# Patient Record
Sex: MALE | Race: WHITE | Employment: FULL TIME | URBAN - METROPOLITAN AREA
[De-identification: names, ages, dates, MRNs, and addresses within clinical notes are randomized per-mention and may not be internally consistent; named-entity substitution may affect disease eponyms.]

---

## 2018-06-12 ENCOUNTER — HOSPITAL ENCOUNTER (EMERGENCY)
Age: 33
Discharge: HOME | End: 2018-06-12
Attending: EMERGENCY MEDICINE
Payer: COMMERCIAL

## 2018-06-12 VITALS
BODY MASS INDEX: 33.33 KG/M2 | TEMPERATURE: 98 F | RESPIRATION RATE: 16 BRPM | SYSTOLIC BLOOD PRESSURE: 121 MMHG | DIASTOLIC BLOOD PRESSURE: 74 MMHG | HEIGHT: 69 IN | OXYGEN SATURATION: 96 % | WEIGHT: 225 LBS | HEART RATE: 81 BPM

## 2018-06-12 DIAGNOSIS — R19.7 DIARRHEA, UNSPECIFIED TYPE: ICD-10-CM

## 2018-06-12 DIAGNOSIS — R10.84 ABDOMINAL PAIN, GENERALIZED: ICD-10-CM

## 2018-06-12 DIAGNOSIS — K62.5 RECTAL BLEEDING: Primary | ICD-10-CM

## 2018-06-12 LAB
ALBUMIN SERPL-MCNC: 4.2 G/DL (ref 3.5–5)
ALBUMIN/GLOB SERPL: 1.2 {RATIO} (ref 1.2–3.5)
ALP SERPL-CCNC: 92 U/L (ref 50–136)
ALT SERPL-CCNC: 146 U/L (ref 12–65)
ANION GAP SERPL CALC-SCNC: 14 MMOL/L (ref 7–16)
AST SERPL-CCNC: 49 U/L (ref 15–37)
BASOPHILS # BLD: 0 K/UL (ref 0–0.2)
BASOPHILS NFR BLD: 0 % (ref 0–2)
BILIRUB SERPL-MCNC: 0.6 MG/DL (ref 0.2–1.1)
BUN SERPL-MCNC: 15 MG/DL (ref 6–23)
CALCIUM SERPL-MCNC: 8.8 MG/DL (ref 8.3–10.4)
CHLORIDE SERPL-SCNC: 103 MMOL/L (ref 98–107)
CO2 SERPL-SCNC: 21 MMOL/L (ref 21–32)
CREAT SERPL-MCNC: 1.04 MG/DL (ref 0.8–1.5)
CRP SERPL-MCNC: 1.2 MG/DL (ref 0–0.9)
DIFFERENTIAL METHOD BLD: NORMAL
EOSINOPHIL # BLD: 0.2 K/UL (ref 0–0.8)
EOSINOPHIL NFR BLD: 3 % (ref 0.5–7.8)
ERYTHROCYTE [DISTWIDTH] IN BLOOD BY AUTOMATED COUNT: 12.8 % (ref 11.9–14.6)
ERYTHROCYTE [SEDIMENTATION RATE] IN BLOOD: 5 MM/HR (ref 0–15)
GLOBULIN SER CALC-MCNC: 3.5 G/DL (ref 2.3–3.5)
GLUCOSE SERPL-MCNC: 104 MG/DL (ref 65–100)
HCT VFR BLD AUTO: 48.7 % (ref 41.1–50.3)
HGB BLD-MCNC: 16.4 G/DL (ref 13.6–17.2)
IMM GRANULOCYTES # BLD: 0 K/UL (ref 0–0.5)
IMM GRANULOCYTES NFR BLD AUTO: 0 % (ref 0–5)
LIPASE SERPL-CCNC: 100 U/L (ref 73–393)
LYMPHOCYTES # BLD: 2.7 K/UL (ref 0.5–4.6)
LYMPHOCYTES NFR BLD: 34 % (ref 13–44)
MCH RBC QN AUTO: 30.1 PG (ref 26.1–32.9)
MCHC RBC AUTO-ENTMCNC: 33.7 G/DL (ref 31.4–35)
MCV RBC AUTO: 89.4 FL (ref 79.6–97.8)
MONOCYTES # BLD: 0.7 K/UL (ref 0.1–1.3)
MONOCYTES NFR BLD: 8 % (ref 4–12)
NEUTS SEG # BLD: 4.5 K/UL (ref 1.7–8.2)
NEUTS SEG NFR BLD: 55 % (ref 43–78)
PLATELET # BLD AUTO: 178 K/UL (ref 150–450)
PMV BLD AUTO: 11.7 FL (ref 10.8–14.1)
POTASSIUM SERPL-SCNC: 4 MMOL/L (ref 3.5–5.1)
PROT SERPL-MCNC: 7.7 G/DL (ref 6.3–8.2)
RBC # BLD AUTO: 5.45 M/UL (ref 4.23–5.67)
SODIUM SERPL-SCNC: 138 MMOL/L (ref 136–145)
WBC # BLD AUTO: 8.2 K/UL (ref 4.3–11.1)

## 2018-06-12 PROCEDURE — 83690 ASSAY OF LIPASE: CPT | Performed by: EMERGENCY MEDICINE

## 2018-06-12 PROCEDURE — 86140 C-REACTIVE PROTEIN: CPT | Performed by: EMERGENCY MEDICINE

## 2018-06-12 PROCEDURE — 80053 COMPREHEN METABOLIC PANEL: CPT | Performed by: EMERGENCY MEDICINE

## 2018-06-12 PROCEDURE — 85652 RBC SED RATE AUTOMATED: CPT | Performed by: EMERGENCY MEDICINE

## 2018-06-12 PROCEDURE — 74011250637 HC RX REV CODE- 250/637: Performed by: EMERGENCY MEDICINE

## 2018-06-12 PROCEDURE — 85025 COMPLETE CBC W/AUTO DIFF WBC: CPT | Performed by: EMERGENCY MEDICINE

## 2018-06-12 PROCEDURE — 99284 EMERGENCY DEPT VISIT MOD MDM: CPT | Performed by: EMERGENCY MEDICINE

## 2018-06-12 RX ORDER — ONDANSETRON 4 MG/1
4 TABLET, ORALLY DISINTEGRATING ORAL ONCE
Status: COMPLETED | OUTPATIENT
Start: 2018-06-12 | End: 2018-06-12

## 2018-06-12 RX ORDER — ONDANSETRON 4 MG/1
4 TABLET, ORALLY DISINTEGRATING ORAL
Qty: 12 TAB | Refills: 0 | Status: SHIPPED | OUTPATIENT
Start: 2018-06-12 | End: 2018-08-31

## 2018-06-12 RX ORDER — ONDANSETRON 4 MG/1
TABLET, ORALLY DISINTEGRATING ORAL
Status: ACTIVE
Start: 2018-06-12 | End: 2018-06-13

## 2018-06-12 RX ORDER — FAMOTIDINE 20 MG/1
TABLET, FILM COATED ORAL
Status: ACTIVE
Start: 2018-06-12 | End: 2018-06-13

## 2018-06-12 RX ORDER — FAMOTIDINE 20 MG/1
20 TABLET, FILM COATED ORAL
Status: COMPLETED | OUTPATIENT
Start: 2018-06-12 | End: 2018-06-12

## 2018-06-12 RX ADMIN — FAMOTIDINE 20 MG: 20 TABLET ORAL at 16:21

## 2018-06-12 RX ADMIN — ONDANSETRON 4 MG: 4 TABLET, ORALLY DISINTEGRATING ORAL at 16:21

## 2018-06-12 NOTE — LETTER
[address] [facility] EMERGENCY DEPT 
[address] [place] [id number] 
[phone] Work/School Note Date: 6/12/2018 To Whom It May concern: 
 
Dileep Guerrero was seen and treated today in the emergency room by the following provider(s): 
No providers found. Dileep Guerrero may return to work on 6/13/18. Sincerely, Alexandra Jensen RN

## 2018-06-12 NOTE — DISCHARGE INSTRUCTIONS
Avoid tobacco, alcohol, caffeine, aspirin, ibuprofen, aleve  Take Omeprazole daily  Take Zofran as needed for nausea

## 2018-06-12 NOTE — ED NOTES
I have reviewed discharge instructions with the patient. The patient verbalized understanding. Patient left ED via Discharge Method: ambulatory to Home with self). Opportunity for questions and clarification provided. Patient given 1 scripts. To continue your aftercare when you leave the hospital, you may receive an automated call from our care team to check in on how you are doing. This is a free service and part of our promise to provide the best care and service to meet your aftercare needs.  If you have questions, or wish to unsubscribe from this service please call [phone]. Thank you for Choosing our [facility] Emergency Department.

## 2018-06-12 NOTE — ED TRIAGE NOTES
Reports bright red rectal bleeding for over a month, abd \"bloating\" and back pain radiating into his shoulders.

## 2018-06-13 NOTE — ED PROVIDER NOTES
HPI Comments: Patient states onset of diarrhea and vomiting yesterday. Watery stools. Has had some bright red blood on tissue and in toilet. No melena. Has had nausea with vomiting three times yesterday. Bloated feeling in abdomen. Goes around to his back. No fever. Took imodium yesterday. Takes Ibuprofen about 4 times a week. Drinks alcohol 3 times a week. Does not smoke. Reports that he has had bright red blood per rectum intermittently for several months. He has not had EGD or colonoscopy in the past. No family history of colon cancer. Has had family history of pancreatic cancer. Patient is a 26 y.o. male presenting with diarrhea. The history is provided by the patient and medical records. Diarrhea    This is a new problem. The current episode started yesterday. The problem has not changed since onset. The pain is associated with vomiting. The pain is located in the generalized abdominal region. The quality of the pain is aching. The pain is mild. Associated symptoms include diarrhea, hematochezia, nausea, vomiting and back pain. Pertinent negatives include no fever, no melena, no constipation, no dysuria, no frequency, no myalgias and no testicular pain. Nothing worsens the pain. The pain is relieved by nothing. Past Medical History:   Diagnosis Date    ADD (attention deficit disorder)     Psychiatric disorder        Past Surgical History:   Procedure Laterality Date    HX ORTHOPAEDIC      right elbow         History reviewed. No pertinent family history. Social History     Social History    Marital status: SINGLE     Spouse name: N/A    Number of children: N/A    Years of education: N/A     Occupational History    Not on file.      Social History Main Topics    Smoking status: Current Every Day Smoker    Smokeless tobacco: Never Used    Alcohol use Yes      Comment: occ    Drug use: No    Sexual activity: Not on file     Other Topics Concern    Not on file     Social History Narrative ALLERGIES: Review of patient's allergies indicates no known allergies. Review of Systems   Constitutional: Negative for appetite change, chills, fever and unexpected weight change. HENT: Negative. Eyes: Negative. Respiratory: Negative. Cardiovascular: Negative. Gastrointestinal: Positive for abdominal pain, anal bleeding, diarrhea, hematochezia, nausea and vomiting. Negative for constipation, melena and rectal pain. Genitourinary: Negative for dysuria, frequency, scrotal swelling and testicular pain. Musculoskeletal: Positive for back pain. Negative for myalgias. Skin: Negative. Neurological: Negative. Hematological: Negative. Psychiatric/Behavioral:        ADHD on medications       Vitals:    06/12/18 1507 06/12/18 1605 06/12/18 1717   BP: 134/86 128/81 121/74   Pulse: (!) 108 88 81   Resp: 18 18 16   Temp: 98 °F (36.7 °C) 98 °F (36.7 °C) 98 °F (36.7 °C)   SpO2: 95% 96% 96%   Weight: 102.1 kg (225 lb)     Height: 5' 9\" (1.753 m)              Physical Exam   Constitutional: He is oriented to person, place, and time. He appears well-developed and well-nourished. HENT:   Head: Normocephalic and atraumatic. Right Ear: External ear normal.   Left Ear: External ear normal.   Mouth/Throat: Oropharynx is clear and moist.   Eyes: Conjunctivae and EOM are normal. Pupils are equal, round, and reactive to light. No scleral icterus. Neck: Normal range of motion. Neck supple. No JVD present. Cardiovascular: Normal rate, regular rhythm, normal heart sounds and intact distal pulses. Pulmonary/Chest: Effort normal and breath sounds normal.   Abdominal: Soft. Bowel sounds are normal. He exhibits no distension and no mass. There is no tenderness. Genitourinary: Penis normal.   Genitourinary Comments: Testicles normal. Rectal - no mass, stool heme negative, prostate small and not tender. No external hemorrhoids or fissures seen. Musculoskeletal: Normal range of motion.  He exhibits no edema or tenderness. Neurological: He is alert and oriented to person, place, and time. Skin: Skin is warm and dry. Psychiatric: He has a normal mood and affect. His behavior is normal.   Nursing note and vitals reviewed. MDM      ED Course       Procedures    Vomiting and diarrhea - acute  Labs reviewed  Pepcid 20 mg IV, Zofran 4 mg IV  Results and instructions discussed with patient  Avoid alcohol, tobacco, caffeine, NSAID's  Start Omeprazole   Rx Zofran 4 mg # 12  Clear liquids today  Follow up with PCP and GI for further evaluation of reported bleeding  Return with new or worse symptoms.

## 2019-02-15 PROBLEM — Z79.899 CONTROLLED SUBSTANCE AGREEMENT SIGNED: Status: ACTIVE | Noted: 2019-02-15

## 2020-09-18 ENCOUNTER — APPOINTMENT (OUTPATIENT)
Dept: GENERAL RADIOLOGY | Age: 35
End: 2020-09-18
Attending: EMERGENCY MEDICINE

## 2020-09-18 ENCOUNTER — HOSPITAL ENCOUNTER (EMERGENCY)
Age: 35
Discharge: HOME | End: 2020-09-18
Attending: EMERGENCY MEDICINE

## 2020-09-18 VITALS
HEART RATE: 100 BPM | DIASTOLIC BLOOD PRESSURE: 86 MMHG | SYSTOLIC BLOOD PRESSURE: 126 MMHG | HEIGHT: 70 IN | WEIGHT: 220 LBS | RESPIRATION RATE: 16 BRPM | TEMPERATURE: 97.5 F | BODY MASS INDEX: 31.5 KG/M2 | OXYGEN SATURATION: 98 %

## 2020-09-18 DIAGNOSIS — S93.601A SPRAIN OF RIGHT FOOT, INITIAL ENCOUNTER: ICD-10-CM

## 2020-09-18 DIAGNOSIS — S93.401A SPRAIN OF RIGHT ANKLE, UNSPECIFIED LIGAMENT, INITIAL ENCOUNTER: Primary | ICD-10-CM

## 2020-09-18 PROCEDURE — 73610 X-RAY EXAM OF ANKLE: CPT

## 2020-09-18 PROCEDURE — 73630 X-RAY EXAM OF FOOT: CPT

## 2020-09-18 PROCEDURE — 99283 EMERGENCY DEPT VISIT LOW MDM: CPT

## 2020-09-18 RX ORDER — IBUPROFEN 800 MG/1
800 TABLET ORAL
Qty: 21 TAB | Refills: 0 | Status: SHIPPED | OUTPATIENT
Start: 2020-09-18 | End: 2021-01-07

## 2020-09-18 RX ORDER — DEXTROAMPHETAMINE SACCHARATE, AMPHETAMINE ASPARTATE, DEXTROAMPHETAMINE SULFATE AND AMPHETAMINE SULFATE 2.5; 2.5; 2.5; 2.5 MG/1; MG/1; MG/1; MG/1
10 TABLET ORAL 2 TIMES DAILY
COMMUNITY
End: 2021-01-07

## 2020-09-18 RX ORDER — PROPRANOLOL HYDROCHLORIDE 60 MG/1
10 CAPSULE, EXTENDED RELEASE ORAL DAILY
COMMUNITY
End: 2021-01-07

## 2020-09-18 RX ORDER — PROPRANOLOL HYDROCHLORIDE 10 MG/1
10 TABLET ORAL DAILY
COMMUNITY
End: 2021-01-07

## 2020-09-18 NOTE — DISCHARGE INSTRUCTIONS
Learning About RICE (Rest, Ice, Compression, and Elevation)  What is RICE? RICE is a way to care for an injury. RICE helps relieve pain and swelling. It may also help with healing and flexibility. RICE stands for:  · R est and protect the injured or sore area. · I ce or a cold pack used as soon as possible. · C ompression, or wrapping the injured or sore area with an elastic bandage. · E levation (propping up) the injured or sore area. How do you do RICE? You can use RICE for home treatment when you have general aches and pains or after an injury or surgery. Rest  · Do not put weight on the injury for at least 24 to 48 hours. · Use crutches for a badly sprained knee or ankle. · Support a sprained wrist, elbow, or shoulder with a sling. Ice  · Put ice or a cold pack on the injury right away to reduce pain and swelling. Frozen vegetables will also work as an ice pack. Put a thin cloth between the ice or cold pack and your skin. The cloth protects the injured area from getting too cold. · Use ice for 10 to 15 minutes at a time for the first 48 to 72 hours. Compression  · Use compression for sprains, strains, and surgeries of the arms and legs. · Wrap the injured area with an elastic bandage or compression sleeve to reduce swelling. · Don't wrap it too tightly. If the area below it feels numb, tingles, or feels cool, loosen the wrap. Elevation  · Use elevation for areas of the body that can be propped up, such as arms and legs. · Prop up the injured area on pillows whenever you use ice. Keep it propped up anytime you sit or lie down. · Try to keep the injured area at or above the level of your heart. This will help reduce swelling and bruising. Where can you learn more? Go to http://jorge-susan.info/  Enter I463 in the search box to learn more about \"Learning About RICE (Rest, Ice, Compression, and Elevation). \"  Current as of: March 2, 2020               Content Version: 12.6  © 2006-2020 [employer]. Care instructions adapted under license by [employer] (which disclaims liability or warranty for this information). If you have questions about a medical condition or this instruction, always ask your healthcare professional. [address] any warranty or liability for your use of this information. Patient Education        Ankle Sprain: Care Instructions  Your Care Instructions     An ankle sprain can happen when you twist your ankle. The ligaments that support the ankle can get stretched and torn. Often the ankle is swollen and painful. Ankle sprains may take from several weeks to several months to heal. Usually, the more pain and swelling you have, the more severe your ankle sprain is and the longer it will take to heal. You can heal faster and regain strength in your ankle with good home treatment. It is very important to give your ankle time to heal completely, so that you do not easily hurt your ankle again. Follow-up care is a key part of your treatment and safety. Be sure to make and go to all appointments, and call your doctor if you are having problems. It's also a good idea to know your test results and keep a list of the medicines you take. How can you care for yourself at home? · Prop up your foot on pillows as much as possible for the next 3 days. Try to keep your ankle above the level of your heart. This will help reduce the swelling. · Follow your doctor's directions for wearing a splint or elastic bandage. Wrapping the ankle may help reduce or prevent swelling. · Your doctor may give you a splint, a brace, an air stirrup, or another form of ankle support to protect your ankle until it is healed. Wear it as directed while your ankle is healing. Do not remove it unless your doctor tells you to. After your ankle has healed, ask your doctor whether you should wear the brace when you exercise.   · Put ice or cold packs on your injured ankle for 10 to 20 minutes at a time. Try to do this every 1 to 2 hours for the next 3 days (when you are awake) or until the swelling goes down. Put a thin cloth between the ice and your skin. · You may need to use crutches until you can walk without pain. If you do use crutches, try to bear some weight on your injured ankle if you can do so without pain. This helps the ankle heal.  · Take pain medicines exactly as directed. ? If the doctor gave you a prescription medicine for pain, take it as prescribed. ? If you are not taking a prescription pain medicine, ask your doctor if you can take an over-the-counter medicine. · If you have been given ankle exercises to do at home, do them exactly as instructed. These can promote healing and help prevent lasting weakness. When should you call for help? Call your doctor now or seek immediate medical care if:    · Your pain is getting worse.     · Your swelling is getting worse.     · Your splint feels too tight or you are unable to loosen it. Watch closely for changes in your health, and be sure to contact your doctor if:    · You are not getting better after 1 week. Where can you learn more? Go to http://jorge-susan.info/  Enter Z705 in the search box to learn more about \"Ankle Sprain: Care Instructions. \"  Current as of: March 2, 2020               Content Version: 12.6  © 2633-0440 [employer]. Care instructions adapted under license by [employer] (which disclaims liability or warranty for this information). If you have questions about a medical condition or this instruction, always ask your healthcare professional. [place] [address] any warranty or liability for your use of this information. Patient Education        Ankle Sprain: Rehab Exercises  Introduction  Here are some examples of exercises for you to try.  The exercises may be suggested for a condition or for rehabilitation. Start each exercise slowly. Ease off the exercises if you start to have pain. You will be told when to start these exercises and which ones will work best for you. How to do the exercises  \"Alphabet\" exercise   1. Trace the alphabet with your toe. This helps your ankle move in all directions. Side-to-side knee swing exercise   1. Sit in a chair with your foot flat on the floor. 2. Slowly move your knee from side to side. Keep your foot pressed flat. 3. Continue this exercise for 2 to 3 minutes. Towel curl   1. While sitting, place your foot on a towel on the floor. Scrunch the towel toward you with your toes. 2. Then use your toes to push the towel away from you. 3. To make this exercise more challenging you can put something on the other end of the towel. A can of soup is about the right weight for this. Towel stretch   1. Sit with your legs extended and knees straight. 2. Place a towel around your foot just under the toes. 3. Hold each end of the towel in each hand, with your hands above your knees. 4. Pull back with the towel so that your foot stretches toward you. 5. Hold the position for at least 15 to 30 seconds. 6. Repeat 2 to 4 times a session. Do up to 5 sessions a day. Ankle eversion exercise   1. Start by sitting with your foot flat on the floor. Push your foot outward against a wall or a piece of furniture that doesn't move. Hold for about 6 seconds, and relax. Repeat 8 to 12 times. 2. After you feel comfortable with this, try using rubber tubing looped around the outside of your feet for resistance. Push your foot out to the side against the tubing, and then count to 10 as you slowly bring your foot back to the middle. Repeat 8 to 12 times. Isometric opposition exercises   1. While sitting, put your feet together flat on the floor. 2. Press your injured foot inward against your other foot. Hold for about 6 seconds, and relax. Repeat 8 to 12 times.   3. Then place the heel of your other foot on top of the injured one. Push down with the top heel while trying to push up with your injured foot. Hold for about 6 seconds, and relax. Repeat 8 to 12 times. Resisted ankle inversion   1. Sit on the floor with your good leg crossed over your other leg. 2. Hold both ends of an exercise band and loop the band around the inside of your affected foot. Then press your other foot against the band. 3. Keeping your legs crossed, slowly push your affected foot against the band so that foot moves away from your other foot. Then slowly relax. 4. Repeat 8 to 12 times. Resisted ankle eversion   1. Sit on the floor with your legs straight. 2. Hold both ends of an exercise band and loop the band around the outside of your affected foot. Then press your other foot against the band. 3. Keeping your leg straight, slowly push your affected foot outward against the band and away from your other foot without letting your leg rotate. Then slowly relax. 4. Repeat 8 to 12 times. Resisted ankle dorsiflexion   1. Tie the ends of an exercise band together to form a loop. Attach one end of the loop to a secure object or shut a door on it to hold it in place. (Or you can have someone hold one end of the loop to provide resistance.)  2. While sitting on the floor or in a chair, loop the other end of the band over the top of your affected foot. 3. Keeping your knee and leg straight, slowly flex your foot to pull back on the exercise band, and then slowly relax. 4. Repeat 8 to 12 times. Single-leg balance   1. Stand on a flat surface with your arms stretched out to your sides like you are making the letter \"T. \" Then lift your good leg off the floor, bending it at the knee. If you are not steady on your feet, use one hand to hold on to a chair, counter, or wall. 2. Standing on the leg with your affected ankle, keep that knee straight. Try to balance on that leg for up to 30 seconds.  Then rest for up to 10 seconds. 3. Repeat 6 to 8 times. 4. When you can balance on your affected leg for 30 seconds with your eyes open, try to balance on it with your eyes closed. 5. When you can do this exercise with your eyes closed for 30 seconds and with ease and no pain, try standing on a pillow or piece of foam, and repeat steps 1 through 4. Follow-up care is a key part of your treatment and safety. Be sure to make and go to all appointments, and call your doctor if you are having problems. It's also a good idea to know your test results and keep a list of the medicines you take. Where can you learn more? Go to http://www.gray.com/  Enter David Reese in the search box to learn more about \"Ankle Sprain: Rehab Exercises. \"  Current as of: March 2, 2020               Content Version: 12.6  © 2006-2020 [employer]. Care instructions adapted under license by [employer] (which disclaims liability or warranty for this information). If you have questions about a medical condition or this instruction, always ask your healthcare professional. [place] [address] any warranty or liability for your use of this information. Patient Education        Foot Sprain: Care Instructions  Your Care Instructions     A foot sprain occurs when you stretch or tear the ligaments around your foot. Ligaments are the tough tissues that connect one bone to another. A sprain can happen when you run, fall, or hit your toe against something. Sprains often happen when you jump or change direction quickly. This may occur when you play basketball, soccer, or other sports. Most foot sprains will get better with treatment at home. Follow-up care is a key part of your treatment and safety. Be sure to make and go to all appointments, and call your doctor if you are having problems. It's also a good idea to know your test results and keep a list of the medicines you take.   How can you care for yourself at home? · Walk or put weight on your sprained foot as long as it does not hurt. · If your doctor gave you a splint or immobilizer, wear it as directed. If you were given crutches, use them as directed. · For the first 2 days after your injury, avoid hot showers, hot tubs, or hot packs. They may increase swelling. · Put ice or a cold pack on your foot for 10 to 20 minutes at a time to stop swelling. Try this every 1 to 2 hours for 3 days (when you are awake) or until the swelling goes down. Put a thin cloth between the ice pack and your skin. Keep your splint dry. · After 2 or 3 days, if your swelling is gone, put a heating pad (set on low) or a warm cloth on your foot. Some doctors suggest that you go back and forth between hot and cold treatments. · Prop up your foot on a pillow when you ice it or anytime you sit or lie down. Try to keep it above the level of your heart. This will help reduce swelling. · Take pain medicines exactly as directed. ? If the doctor gave you a prescription medicine for pain, take it as prescribed. ? If you are not taking a prescription pain medicine, ask your doctor if you can take an over-the-counter medicine. · Do any exercises that your doctor or physical therapist suggests. · Return to your usual exercise gradually as you feel better. When should you call for help? Call your doctor now or seek immediate medical care if:    · You have increased or severe pain.     · Your toes are cool or pale or change color.     · Your wrap or splint feels too tight.     · You have signs of a blood clot, such as:  ? Pain in your calf, back of the knee, thigh, or groin. ? Redness and swelling in your leg or groin.     · You have tingling, weakness, or numbness in your leg or foot.    Watch closely for changes in your health, and be sure to contact your doctor if:    · You cannot put any weight on your foot.     · You get a fever.     · You do not get better as expected. Where can you learn more? Go to http://jorge-susan.info/  Enter S722 in the search box to learn more about \"Foot Sprain: Care Instructions. \"  Current as of: March 2, 2020               Content Version: 12.6  © 2972-8965 [employer]. Care instructions adapted under license by [employer] (which disclaims liability or warranty for this information). If you have questions about a medical condition or this instruction, always ask your healthcare professional. [address] any warranty or liability for your use of this information.

## 2020-09-18 NOTE — ED NOTES
I have reviewed discharge instructions with the patient. The patient verbalized understanding. Patient left ED via Discharge Method: ambulatory to Home with self. Opportunity for questions and clarification provided. Patient given 1 scripts. To continue your aftercare when you leave the hospital, you may receive an automated call from our care team to check in on how you are doing. This is a free service and part of our promise to provide the best care and service to meet your aftercare needs.  If you have questions, or wish to unsubscribe from this service please call [phone]. Thank you for Choosing our [facility] Emergency Department.

## 2020-09-18 NOTE — ED PROVIDER NOTES
Ciaran Wade is a 38 y.o. male seen on 9/18/2020 at 3:54 PM in the [address] [place] [address] in room HE/E.    CC: Ankle pain    HPI: 78-year-old  male complains of right foot and ankle pain status post rolling the ankle wall taking a walk at his apartment complex. Injury occurred 2 days ago but patient continues to complain of pain with ambulation. Wants to make sure nothing is broken. He does complain of some numbness to the bottom of his foot. The history is provided by the patient. Ankle Pain    This is a new problem. The current episode started 2 days ago. The problem occurs constantly. The problem has been gradually worsening. The pain is present in the right foot and right ankle. The quality of the pain is described as aching. The pain is moderate. Associated symptoms include numbness (Patient complains of some numbness to his toes) and stiffness. Pertinent negatives include no tingling, no itching, no back pain and no neck pain. The symptoms are aggravated by standing and movement. Treatments tried: Rest and compressive wraps. The treatment provided no relief. REVIEW OF SYSTEMS     Review of Systems   Constitutional: Negative for chills and fever. Musculoskeletal: Positive for arthralgias and stiffness. Negative for back pain and neck pain. Skin: Negative for itching. Neurological: Positive for numbness (Patient complains of some numbness to his toes). Negative for tingling. All other systems reviewed and are negative.       PAST MEDICAL HISTORY     Past Medical History:   Diagnosis Date    ADD (attention deficit disorder)     Psychiatric disorder      Past Surgical History:   Procedure Laterality Date    HX ORTHOPAEDIC      right elbow     Social History     Socioeconomic History    Marital status: SINGLE     Spouse name: Not on file    Number of children: Not on file    Years of education: Not on file    Highest education level: Not on file   Tobacco Use    Smoking status: Current Every Day Smoker    Smokeless tobacco: Never Used   Substance and Sexual Activity    Alcohol use: Yes     Comment: occ    Drug use: No     Prior to Admission Medications   Prescriptions Last Dose Informant Patient Reported? Taking?   dextroamphetamine-amphetamine (AdderalL) 10 mg tablet   Yes Yes   Sig: Take 10 mg by mouth two (2) times a day. propranoloL (INDERAL) 10 mg tablet   Yes Yes   Sig: Take 10 mg by mouth daily. propranolol LA (INDERAL LA) 60 mg SR capsule   Yes Yes   Sig: Take 10 mg by mouth daily. Facility-Administered Medications: None     No Known Allergies     PHYSICAL EXAM       Vitals:    09/18/20 1534   BP: 130/87   Pulse: (!) 102   Resp: 16   Temp: 98.5 °F (36.9 °C)   SpO2: 96%    Vital signs were reviewed. Physical Exam  Vitals signs and nursing note reviewed. Constitutional:       General: He is not in acute distress. HENT:      Head: Normocephalic and atraumatic. Musculoskeletal:      Right ankle: He exhibits decreased range of motion. He exhibits no swelling, no ecchymosis, no deformity, no laceration and normal pulse. Tenderness. Lateral malleolus, AITFL, CF ligament and posterior TFL tenderness found. No medial malleolus, no head of 5th metatarsal and no proximal fibula tenderness found. Achilles tendon normal.      Comments: In order to examine the ankle and foot, 3 separate compressive wraps had to be removed from patient's ankle and foot including 1 so tight around his midfoot that his toes were slightly swollen. Skin:     General: Skin is warm and dry. Neurological:      General: No focal deficit present. Mental Status: He is alert and oriented to person, place, and time.    Psychiatric:         Mood and Affect: Mood normal.         Behavior: Behavior normal.          MEDICAL DECISION MAKING     MDM  Number of Diagnoses or Management Options  Sprain of right ankle, unspecified ligament, initial encounter: new, needed workup  Sprain of right foot, initial encounter: new, needed workup     Amount and/or Complexity of Data Reviewed  Tests in the radiology section of CPT®: ordered and reviewed  Review and summarize past medical records: yes  Independent visualization of images, tracings, or specimens: yes    Risk of Complications, Morbidity, and/or Mortality  Presenting problems: low  Diagnostic procedures: low  Management options: low    Patient Progress  Patient progress: stable    Procedures    ED Course: The patient was observed in the ED. Results Reviewed:    XR ANKLE RT MIN 3 V   Final Result   IMPRESSION:   No acute radiographic abnormality. XR FOOT RT MIN 3 V   Final Result   IMPRESSION:   No acute radiographic abnormality of the right foot. Disposition: Discharge  Diagnosis: Sprain of right ankle and foot ____________________________________________________________________  I discussed the results of all labs, procedures, radiographs, and treatments with the patient and available family. Treatment plan is agreed upon and the patient is ready for discharge. All voiced understanding of the discharge plan and medication instructions or changes as appropriate. Questions about treatment in the ED were answered. All were encouraged to return should symptoms worsen or new problems develop. A portion of this note was generated using voice recognition dictation software. While the note has been reviewed for accuracy, please note certain words and phrases may not be transcribed as intended and some grammatical and/or typographical errors may be present.

## 2020-10-18 ENCOUNTER — HOSPITAL ENCOUNTER (EMERGENCY)
Age: 35
Discharge: HOME | End: 2020-10-18
Attending: EMERGENCY MEDICINE

## 2020-10-18 ENCOUNTER — APPOINTMENT (OUTPATIENT)
Dept: CT IMAGING | Age: 35
End: 2020-10-18
Attending: EMERGENCY MEDICINE

## 2020-10-18 VITALS
SYSTOLIC BLOOD PRESSURE: 121 MMHG | OXYGEN SATURATION: 99 % | RESPIRATION RATE: 16 BRPM | HEIGHT: 68 IN | WEIGHT: 225 LBS | TEMPERATURE: 98.1 F | DIASTOLIC BLOOD PRESSURE: 74 MMHG | BODY MASS INDEX: 34.1 KG/M2 | HEART RATE: 67 BPM

## 2020-10-18 DIAGNOSIS — K62.5 RECTAL BLEEDING: Primary | ICD-10-CM

## 2020-10-18 LAB
ALBUMIN SERPL-MCNC: 3.7 G/DL (ref 3.5–5)
ALBUMIN/GLOB SERPL: 1.1 {RATIO} (ref 1.2–3.5)
ALP SERPL-CCNC: 92 U/L (ref 50–136)
ALT SERPL-CCNC: 88 U/L (ref 12–65)
ANION GAP SERPL CALC-SCNC: 5 MMOL/L (ref 7–16)
AST SERPL-CCNC: 31 U/L (ref 15–37)
BASOPHILS # BLD: 0.1 K/UL (ref 0–0.2)
BASOPHILS NFR BLD: 1 % (ref 0–2)
BILIRUB SERPL-MCNC: 0.4 MG/DL (ref 0.2–1.1)
BUN SERPL-MCNC: 14 MG/DL (ref 6–23)
CALCIUM SERPL-MCNC: 8.5 MG/DL (ref 8.3–10.4)
CHLORIDE SERPL-SCNC: 110 MMOL/L (ref 98–107)
CO2 SERPL-SCNC: 27 MMOL/L (ref 21–32)
CREAT SERPL-MCNC: 0.98 MG/DL (ref 0.8–1.5)
DIFFERENTIAL METHOD BLD: NORMAL
EOSINOPHIL # BLD: 0.2 K/UL (ref 0–0.8)
EOSINOPHIL NFR BLD: 3 % (ref 0.5–7.8)
ERYTHROCYTE [DISTWIDTH] IN BLOOD BY AUTOMATED COUNT: 12.5 % (ref 11.9–14.6)
GLOBULIN SER CALC-MCNC: 3.3 G/DL (ref 2.3–3.5)
GLUCOSE SERPL-MCNC: 113 MG/DL (ref 65–100)
HCT VFR BLD AUTO: 43.2 % (ref 41.1–50.3)
HGB BLD-MCNC: 14.2 G/DL (ref 13.6–17.2)
IMM GRANULOCYTES # BLD AUTO: 0 K/UL (ref 0–0.5)
IMM GRANULOCYTES NFR BLD AUTO: 0 % (ref 0–5)
LYMPHOCYTES # BLD: 2.5 K/UL (ref 0.5–4.6)
LYMPHOCYTES NFR BLD: 33 % (ref 13–44)
MCH RBC QN AUTO: 30.5 PG (ref 26.1–32.9)
MCHC RBC AUTO-ENTMCNC: 32.9 G/DL (ref 31.4–35)
MCV RBC AUTO: 92.9 FL (ref 79.6–97.8)
MONOCYTES # BLD: 0.6 K/UL (ref 0.1–1.3)
MONOCYTES NFR BLD: 8 % (ref 4–12)
NEUTS SEG # BLD: 4.2 K/UL (ref 1.7–8.2)
NEUTS SEG NFR BLD: 55 % (ref 43–78)
NRBC # BLD: 0 K/UL (ref 0–0.2)
PLATELET # BLD AUTO: 182 K/UL (ref 150–450)
PMV BLD AUTO: 10.6 FL (ref 9.4–12.3)
POTASSIUM SERPL-SCNC: 4.1 MMOL/L (ref 3.5–5.1)
PROT SERPL-MCNC: 7 G/DL (ref 6.3–8.2)
RBC # BLD AUTO: 4.65 M/UL (ref 4.23–5.6)
SODIUM SERPL-SCNC: 142 MMOL/L (ref 136–145)
WBC # BLD AUTO: 7.6 K/UL (ref 4.3–11.1)

## 2020-10-18 PROCEDURE — 74177 CT ABD & PELVIS W/CONTRAST: CPT

## 2020-10-18 PROCEDURE — 74011000636 HC RX REV CODE- 636: Performed by: EMERGENCY MEDICINE

## 2020-10-18 PROCEDURE — 81003 URINALYSIS AUTO W/O SCOPE: CPT

## 2020-10-18 PROCEDURE — 74011000258 HC RX REV CODE- 258: Performed by: EMERGENCY MEDICINE

## 2020-10-18 PROCEDURE — 80053 COMPREHEN METABOLIC PANEL: CPT

## 2020-10-18 PROCEDURE — 99284 EMERGENCY DEPT VISIT MOD MDM: CPT

## 2020-10-18 PROCEDURE — 85025 COMPLETE CBC W/AUTO DIFF WBC: CPT

## 2020-10-18 RX ORDER — SODIUM CHLORIDE 0.9 % (FLUSH) 0.9 %
10 SYRINGE (ML) INJECTION
Status: DISCONTINUED | OUTPATIENT
Start: 2020-10-18 | End: 2020-10-18 | Stop reason: HOSPADM

## 2020-10-18 RX ORDER — SODIUM CHLORIDE 0.9 % (FLUSH) 0.9 %
10 SYRINGE (ML) INJECTION
Status: COMPLETED | OUTPATIENT
Start: 2020-10-18 | End: 2020-10-18

## 2020-10-18 RX ORDER — CLONAZEPAM 1 MG/1
TABLET ORAL
COMMUNITY
Start: 2020-08-14

## 2020-10-18 RX ADMIN — SODIUM CHLORIDE 100 ML: 900 INJECTION, SOLUTION INTRAVENOUS at 12:09

## 2020-10-18 RX ADMIN — IOPAMIDOL 100 ML: 755 INJECTION, SOLUTION INTRAVENOUS at 12:09

## 2020-10-18 RX ADMIN — Medication 10 ML: at 12:09

## 2020-10-18 NOTE — ED PROVIDER NOTES
Patient is a 13-year-old male who comes to the emergency department today complaining of rectal bleeding and some abdominal pain for the past 3 weeks. He states it first started with wiping after bowel movements has progressed to just passing red blood spontaneously. He has developed some lower abdominal crampy pain. No fever. Also states she has had some urinary frequency and urgency. The history is provided by the patient. Rectal Bleeding    This is a chronic problem. The current episode started more than 1 week ago. The stool is described as blood tinged. Associated symptoms include abdominal pain. Pertinent negatives include no dysuria, no chills, no fever, no nausea, no back pain, no vomiting and no diarrhea. He has tried nothing for the symptoms. His past medical history does not include irritable bowel syndrome, small bowel obstruction or abdominal surgery. Past Medical History:   Diagnosis Date    ADD (attention deficit disorder)     OCD (obsessive compulsive disorder)     Psychiatric disorder        Past Surgical History:   Procedure Laterality Date    HX ORTHOPAEDIC      right elbow         History reviewed. No pertinent family history.     Social History     Socioeconomic History    Marital status: SINGLE     Spouse name: Not on file    Number of children: Not on file    Years of education: Not on file    Highest education level: Not on file   Occupational History    Not on file   Social Needs    Financial resource strain: Not on file    Food insecurity     Worry: Not on file     Inability: Not on file    Transportation needs     Medical: Not on file     Non-medical: Not on file   Tobacco Use    Smoking status: Current Every Day Smoker    Smokeless tobacco: Never Used   Substance and Sexual Activity    Alcohol use: Yes     Comment: occ    Drug use: No    Sexual activity: Not on file   Lifestyle    Physical activity     Days per week: Not on file     Minutes per session: Not on file    Stress: Not on file   Relationships    Social connections     Talks on phone: Not on file     Gets together: Not on file     Attends Orthodoxy service: Not on file     Active member of club or organization: Not on file     Attends meetings of clubs or organizations: Not on file     Relationship status: Not on file    Intimate partner violence     Fear of current or ex partner: Not on file     Emotionally abused: Not on file     Physically abused: Not on file     Forced sexual activity: Not on file   Other Topics Concern    Not on file   Social History Narrative    Not on file         ALLERGIES: Patient has no known allergies. Review of Systems   Constitutional: Negative for chills, fatigue and fever. HENT: Negative for congestion, rhinorrhea and sore throat. Eyes: Negative for pain, discharge and visual disturbance. Respiratory: Negative for cough and shortness of breath. Cardiovascular: Negative for chest pain and palpitations. Gastrointestinal: Positive for abdominal pain and anal bleeding. Negative for diarrhea, nausea and vomiting. Endocrine: Negative for polydipsia and polyuria. Genitourinary: Positive for difficulty urinating, frequency and urgency. Negative for dysuria. Musculoskeletal: Negative for back pain and neck pain. Skin: Negative for rash. Neurological: Negative for seizures, syncope and weakness. Hematological: Negative. Vitals:    10/18/20 1118   BP: 125/83   Pulse: 65   Resp: 16   Temp: 97.9 °F (36.6 °C)   SpO2: 97%   Weight: 102.1 kg (225 lb)   Height: 5' 8\" (1.727 m)            Physical Exam  Vitals signs and nursing note reviewed. Constitutional:       Appearance: Normal appearance. He is well-developed. HENT:      Head: Normocephalic and atraumatic. Nose: Nose normal.   Eyes:      Extraocular Movements: Extraocular movements intact.       Conjunctiva/sclera: Conjunctivae normal.      Pupils: Pupils are equal, round, and reactive to light.   Neck:      Musculoskeletal: Normal range of motion and neck supple. Cardiovascular:      Rate and Rhythm: Normal rate and regular rhythm. Heart sounds: Normal heart sounds. Pulmonary:      Effort: Pulmonary effort is normal.      Breath sounds: Normal breath sounds. Abdominal:      Palpations: Abdomen is soft. Tenderness: There is abdominal tenderness in the right lower quadrant and left lower quadrant. There is no guarding or rebound. Hernia: No hernia is present. Musculoskeletal: Normal range of motion. General: No tenderness. Lymphadenopathy:      Cervical: No cervical adenopathy. Skin:     General: Skin is warm and dry. Capillary Refill: Capillary refill takes less than 2 seconds. Findings: No rash. Neurological:      General: No focal deficit present. Mental Status: He is alert and oriented to person, place, and time. GCS: GCS eye subscore is 4. GCS verbal subscore is 5. GCS motor subscore is 6. Cranial Nerves: No cranial nerve deficit. Sensory: No sensory deficit. Motor: Motor function is intact. Psychiatric:         Mood and Affect: Mood is anxious. MDM  Number of Diagnoses or Management Options  Diagnosis management comments: I wore appropriate PPE throughout this patient's ED visit. Precious Marks MD, 11:53 AM    12:50 PM  Hemoglobin normal  White count normal chemistries unremarkable urinalysis negative CT scan of the and pelvis negative. Given his benign exam and normal labs I think he can safely be discharged home he does not need admission at this time. Will refer to gastroenterology for outpatient follow-up and colonoscopy. Voice dictation software was used during the making of this note. This software is not perfect and grammatical and other typographical errors may be present. This note has been proofread, but may still contain errors.   Precious Marks MD; 10/18/2020 @12:50 PM ===================================================================             Amount and/or Complexity of Data Reviewed  Clinical lab tests: ordered and reviewed  Tests in the radiology section of CPT®: ordered and reviewed  Independent visualization of images, tracings, or specimens: yes    Risk of Complications, Morbidity, and/or Mortality  Presenting problems: moderate  Diagnostic procedures: moderate  Management options: low    Patient Progress  Patient progress: stable         Procedures

## 2020-10-18 NOTE — ED TRIAGE NOTES
Pt c/o blood in stool x3 weeks, last time being 30 minutes ago, abdominal pain and difficulty urinating. Pt denies any pain during urination. Pt denies history of hemorrhoids. Pt masked upon arrival to the ED.

## 2020-10-18 NOTE — ED NOTES
I have reviewed discharge instructions with the patient. The patient verbalized understanding. Patient left ED via Discharge Method: ambulatory to Home with self. Opportunity for questions and clarification provided. Patient given 0 scripts. To continue your aftercare when you leave the hospital, you may receive an automated call from our care team to check in on how you are doing. This is a free service and part of our promise to provide the best care and service to meet your aftercare needs.  If you have questions, or wish to unsubscribe from this service please call [phone]. Thank you for Choosing our [facility] Emergency Department.

## 2021-01-07 ENCOUNTER — HOSPITAL ENCOUNTER (EMERGENCY)
Age: 36
Discharge: HOME | End: 2021-01-07
Attending: EMERGENCY MEDICINE

## 2021-01-07 ENCOUNTER — APPOINTMENT (OUTPATIENT)
Dept: GENERAL RADIOLOGY | Age: 36
End: 2021-01-07
Attending: PHYSICIAN ASSISTANT

## 2021-01-07 VITALS
HEART RATE: 99 BPM | BODY MASS INDEX: 35.61 KG/M2 | SYSTOLIC BLOOD PRESSURE: 136 MMHG | HEIGHT: 68 IN | TEMPERATURE: 98 F | DIASTOLIC BLOOD PRESSURE: 86 MMHG | OXYGEN SATURATION: 98 % | WEIGHT: 235 LBS | RESPIRATION RATE: 16 BRPM

## 2021-01-07 DIAGNOSIS — S43.402A SPRAIN OF LEFT SHOULDER, UNSPECIFIED SHOULDER SPRAIN TYPE, INITIAL ENCOUNTER: Primary | ICD-10-CM

## 2021-01-07 PROCEDURE — 73030 X-RAY EXAM OF SHOULDER: CPT

## 2021-01-07 PROCEDURE — 99283 EMERGENCY DEPT VISIT LOW MDM: CPT

## 2021-01-07 NOTE — ED NOTES
I have reviewed discharge instructions with the patient. The patient verbalized understanding. Patient left ED via Discharge Method: ambulatory to Home with self. Opportunity for questions and clarification provided. Patient given 0 scripts. To continue your aftercare when you leave the hospital, you may receive an automated call from our care team to check in on how you are doing. This is a free service and part of our promise to provide the best care and service to meet your aftercare needs.  If you have questions, or wish to unsubscribe from this service please call [phone]. Thank you for Choosing our [facility] Emergency Department.

## 2021-01-07 NOTE — DISCHARGE INSTRUCTIONS
Wear sling for comfort for the next 2 days, do shoulder exercises as seen on handout call office to arrange follow-up appointment.   Alternate Tylenol Motrin for pain

## 2021-01-07 NOTE — ED TRIAGE NOTES
Patient advises that he tripped on 1/5/2021 and landed down on left elbow with it sliding and hurting into left shoulder patient advises occasional numbness to arm while in certain positions. Mask on during triage. Patient also advises bruise to left upper leg.

## 2021-01-07 NOTE — ED PROVIDER NOTES
Patient complains of left shoulder pain for the last 2 days. He states 2 days ago he fell on the left elbow 2 hours later had pain to the left shoulder and increasing pain and stiffness since that time. He has been using Tylenol and glucosamine with minimal relief. He denies any neck pain. Is occasionally his hands will feel tingly. He is right-hand dominant    The history is provided by the patient. Shoulder Pain   The incident occurred 2 days ago. The incident occurred at home. The injury mechanism was a fall. The left shoulder is affected. The pain is at a severity of 9/10. The pain is moderate. The pain has been constant since onset. The pain radiates. There is no history of shoulder injury. He has no other injuries. There is no history of shoulder surgery. Associated symptoms include tingling. Past Medical History:   Diagnosis Date    ADD (attention deficit disorder)     OCD (obsessive compulsive disorder)     Psychiatric disorder        Past Surgical History:   Procedure Laterality Date    HX ORTHOPAEDIC      right elbow         History reviewed. No pertinent family history.     Social History     Socioeconomic History    Marital status: SINGLE     Spouse name: Not on file    Number of children: Not on file    Years of education: Not on file    Highest education level: Not on file   Occupational History    Not on file   Social Needs    Financial resource strain: Not on file    Food insecurity     Worry: Not on file     Inability: Not on file    Transportation needs     Medical: Not on file     Non-medical: Not on file   Tobacco Use    Smoking status: Former Smoker    Smokeless tobacco: Never Used   Substance and Sexual Activity    Alcohol use: Yes     Comment: occ    Drug use: No    Sexual activity: Not on file   Lifestyle    Physical activity     Days per week: Not on file     Minutes per session: Not on file    Stress: Not on file   Relationships    Social connections Talks on phone: Not on file     Gets together: Not on file     Attends Adventism service: Not on file     Active member of club or organization: Not on file     Attends meetings of clubs or organizations: Not on file     Relationship status: Not on file    Intimate partner violence     Fear of current or ex partner: Not on file     Emotionally abused: Not on file     Physically abused: Not on file     Forced sexual activity: Not on file   Other Topics Concern    Not on file   Social History Narrative    Not on file         ALLERGIES: Patient has no known allergies. Review of Systems   Neurological: Positive for tingling. All other systems reviewed and are negative. Vitals:    01/07/21 0944   BP: (!) 143/97   Pulse: 97   Resp: 16   Temp: 97.6 °F (36.4 °C)   SpO2: 96%   Weight: 106.6 kg (235 lb)   Height: 5' 8\" (1.727 m)            Physical Exam  Vitals signs and nursing note reviewed. Constitutional:       General: He is not in acute distress. Appearance: Normal appearance. He is well-developed. He is not diaphoretic. HENT:      Head: Normocephalic and atraumatic. Eyes:      Pupils: Pupils are equal, round, and reactive to light. Neck:      Musculoskeletal: Normal range of motion and neck supple. Cardiovascular:      Rate and Rhythm: Normal rate and regular rhythm. Pulmonary:      Effort: Pulmonary effort is normal.      Breath sounds: Normal breath sounds. Abdominal:      General: Bowel sounds are normal.      Palpations: Abdomen is soft. Musculoskeletal: Normal range of motion. General: Tenderness present. Comments: Left shoulder without any obvious deformity. Diffuse pain about the anterior shoulder area. Pain radiates down into the elbow area but no tenderness to palpation about the elbow or wrist.  No obvious swelling noted to the arm. Full painless range of motion of the left wrist and left elbow.   Patient has limited active range of motion of the shoulder full passive range motion of the shoulder focal tenderness to palpation intact radial pulses   Skin:     General: Skin is warm. Neurological:      General: No focal deficit present. Mental Status: He is alert and oriented to person, place, and time. Psychiatric:         Mood and Affect: Mood normal.         Behavior: Behavior normal.          MDM  Number of Diagnoses or Management Options  Diagnosis management comments: Left shoulder x-rays negative for any acute abnormality. Shoulder strain possible rotator cuff injury, will treat with sling shoulder exercises and referral to orthopedics.   Work note given       Amount and/or Complexity of Data Reviewed  Tests in the radiology section of CPT®: ordered and reviewed  Review and summarize past medical records: yes    Risk of Complications, Morbidity, and/or Mortality  Presenting problems: low  Diagnostic procedures: low  Management options: low    Patient Progress  Patient progress: improved         Procedures

## 2021-01-07 NOTE — LETTER
[facility] [facility] EMERGENCY DEPT 
[address] [facility] [address] 
[phone] Work/School Note Date: 1/7/2021 To Whom It May concern: 
 
Ignacio Morales was seen and treated today in the emergency room by the following provider(s): 
Attending Provider: Sharda Mittal MD 
Physician Assistant: WENDY Staples. Ignacio Morales is excused from work/school on 1/7/2021 through 1/10/2021. He is medically clear to return to work/school on 1/11/2021. Sincerely, WENDY Christopher

## 2021-01-12 NOTE — PROGRESS NOTES
Pt called in requesting that recent records be faxed to [facility], #[phone]. No further needs at this time.

## 2021-03-03 ENCOUNTER — HOSPITAL ENCOUNTER (OUTPATIENT)
Dept: PHYSICAL THERAPY | Age: 36
Discharge: HOME | End: 2021-03-03

## 2021-03-03 PROCEDURE — 97161 PT EVAL LOW COMPLEX 20 MIN: CPT

## 2021-03-03 PROCEDURE — 97110 THERAPEUTIC EXERCISES: CPT

## 2021-03-03 NOTE — PROGRESS NOTES
Gonzalo Osgood  : 1985  Payor: /  Nunu Trejo at [facility]  [address], [address]  [phone]   [phone]      OUTPATIENT PHYSICAL THERAPY: Daily Treatment Note 3/3/2021  Visit Count:  1    ICD-10: Treatment Diagnosis: left shoulder pain M25.512, left shoulder stiffness  Precautions/Allergies:   Patient has no known allergies. TREATMENT PLAN:  Effective Dates: 3/3/2021 TO 2021 (60 days). Frequency/Duration: 2 times a week for 60 Day(s) MEDICAL/REFERRING DIAGNOSIS:  Pain in left shoulder [M25.512]   DATE OF ONSET:   REFERRING PHYSICIAN: Triny Thompson MD MD Orders: Osmel Valerie and treat  Return MD Appointment: 3/4/2021     Pre-treatment Symptoms/Complaints:  Pt seen for eval today. Increased pain with use of L UE. Pain: Initial: Pain Intensity 1: 5  Post Session:  5/10   Medications Last Reviewed:  3/3/2021  Updated Objective Findings:  See evaluation note from today   TREATMENT:   THERAPEUTIC EXERCISE: (15 minutes):  Exercises per grid below to improve mobility and strength. Required minimal verbal cues to promote proper body mechanics. Progressed resistance, range and repetitions as indicated. Supine wand ER x 20  Seated wand flexion x 20  Rows red theraband x 20  Punches red theraband x 20  ER red theraband x 20  Manual Therapy (  na    ):   na  Therapeutic Modalities ( na    ):na    Evaluation (x)    HEP: As above; handouts given to patient for all exercises. Treatment/Session Summary:    Response to Treatment:  Pt seen for eval.  Pt reported feeling some looser during treatment session, but also muscle fatigue noted with strengthening. Communication/Consultation:  None today  Equipment provided today:  red theraband  Recommendations/Intent for next treatment session: Next visit will focus on continued ROM and strengthening.   Total Treatment Billable Duration:  Eval plus 15 min  PT Patient Time In/Time Out  Time In: 0845  Time Out: [address], PT    Future Appointments   Date Time Provider Simran Mckeon   3/5/2021  8:45 AM [facility]

## 2021-03-03 NOTE — THERAPY EVALUATION
Kevyn Hackett  : 1985  Payor: /  Amanda Case at [facility]  [address], [facility] [address]  [phone]   [phone]         OUTPATIENT PHYSICAL THERAPY:Initial Assessment 3/3/2021   ICD-10: Treatment Diagnosis: left shoulder pain M25.512, left shoulder stiffness  Precautions/Allergies:   Patient has no known allergies. TREATMENT PLAN:  Effective Dates: 3/3/2021 TO 2021 (60 days). Frequency/Duration: 2 times a week for 60 Day(s) and upon reassessment, will adjust frequency and duration as progress indicates. MEDICAL/REFERRING DIAGNOSIS:  Pain in left shoulder [M25.512]   DATE OF ONSET: fall on 2021  REFERRING PHYSICIAN: King Mo MD MD Orders: Salli Likes and treat  Return MD Appointment: 3/4/2021     INITIAL ASSESSMENT:  Mr. Olive Garber presents with increased left shoulder pain after tripping and falling on entryway leading into his apartment building. Accident occurred on 2021 and pt reported feeling increased pain roughly 2 days later with loss of motion and strength. Pt seen in ER and was placed in sling. X-rays were negative. Pt continued to have pain and he saw ortho. Further x-ray and MRI revealed small fracture. Pt presents today with loss of motion and strength and inability to use the arm for ADL's and for work tasks like reaching, lifting, pushing and pulling. Pt will benefit from skilled therapy to address his weakness as well as loss of motion and allow him to return to all prior activities.    PROBLEM LIST (Impacting functional limitations):  Decreased Strength  Decreased ADL/Functional Activities  Increased Pain  Decreased Flexibility/Joint Mobility INTERVENTIONS PLANNED: (Treatment may consist of any combination of the following)  Cold  Heat  Home Exercise Program (HEP)  Manual Therapy  Range of Motion (ROM)  Therapeutic Exercise/Strengthening     GOALS: (Goals have been discussed and agreed upon with patient.)  Short-Term Functional Goals: Time Frame: 4 weeks  Establish independent HEP with no cuing. Pt will be able to gain 10 degrees of active motion for overhead reaching. Pt will be able to report ability to sleep on either side. Pt will be able to gain 1/2 grade increase in strength required for lifting 10 plus pound objects. Discharge Goals: Time Frame: 8 weeks (60 days)  Pt will be able to improve score on   Pt will be able to report ability to lift and carry at least 30 pounds. Pt will be able to restore motion to [facility] for L UE for all reaching. Pt will be able to report ability to work overhead for up to 1-2 minutes. OUTCOME MEASURE:   Tool Used: Dmitri  Score:  Initial: 31/100 Most Recent: X (Date: -- )   Interpretation of Score:   Interpretation of Score: 20 questions each scored on a 3 point scale with 0 representing \"extreme difficulty or unable to perform\" and 3 representing \"no difficulty\", 3 questions each scored from 0-10 about pain, and 1 question scored 0-10 about function of the shoulder  The lower the score, the greater the functional disability. 100/100 represents no disability, pain or loss of function.  Minimal clinically important difference is 11.4. Minimal detectable change is 12.1    Outcome Measure Conversion Site          MEDICAL NECESSITY:   Patient is expected to demonstrate progress in strength and range of motion to improve his overall ability to use the left arm for ADL's to include reaching, lifting, pushing and pulling. REASON FOR SERVICES/OTHER COMMENTS:  Patient continues to require skilled intervention due to lack of full left shoulder ROM and strength needed for ADL's and work tasks. Total Duration:  PT Patient Time In/Time Out  Time In: 0845  Time Out: 0930    Rehabilitation Potential For Stated Goals: Good  Regarding Felipa Zhu's therapy, I certify that the treatment plan above will be carried out by a therapist or under their direction.   Thank you for this referral,  Varinder Lindquist PT     Referring Physician Signature: Oni Crowder MD No Signature is Required for this note. PAIN/SUBJECTIVE:   Initial: Pain Intensity 1: 5  Post Session:  5/10   HISTORY:   History of Injury/Illness (Reason for Referral):  Mr. Arianna Mancuso presents with increased left shoulder pain after tripping and falling on entryway leading into his apartment building. Accident occurred on 1/5/2021 and pt reported feeling increased pain roughly 2 days later with loss of motion and strength. Pt seen in ER and was placed in sling. X-rays were negative. Pt continued to have pain and he saw ortho. Further x-ray and MRI revealed small fracture. Pt reports inability to sleep on right side due to increased left shoulder pain and has increased difficulty using the left arm for upwards reaching, lifting and pushing and pulling of objects. 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 Past Medical History/Comorbidities:   Mr. Arianna Mancuso  has a past medical history of ADD (attention deficit disorder), OCD (obsessive compulsive disorder), and Psychiatric disorder. Mr. Arianna Mancuso  has a past surgical history that includes hx orthopaedic. 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 [facility] Patent #[id number].  Federal Law prohibits the replication, distribution or use without written permission from [facility] [employer] [facility]   Current Medications:       Current Outpatient Medications:     clonazePAM (KlonoPIN) 1 mg tablet, TAKE 1/2 TO 1 TABLET BY MOUTH UP TO 2 TIMES A DAY AS NEEDED, Disp: , Rfl: -pt has aleve if needed, but is currently taking it as needed   Date Last Reviewed:  3/3/2021     Number of Personal Factors/Comorbidities that affect the Plan of Care: 1-2: MODERATE COMPLEXITY        EXAMINATION:   Palpation:          Negative today  ROM:        R UE flex 150 °  abd 156 °  ER 65 °  IR 71 °                   L UE flex 134 °  abd 145 °  ER 55 °  IR 61 °  PROM for flex in supine 166 °  PROM for abduction in supine 160 °           Strength:     R UE 5/5         L UE flex 4-/5, abd 4-/5, ER 4-/5, IR 4/5, biceps 4+/5  Pain with resisted MMT            Special Tests: na  Neurological Screen: na  Balance:  despite trip and fall, pt has normal dynamic balance in clinic      Body Structures Involved:  Bones  Joints  Muscles  Ligaments Body Functions Affected:  Sensory/Pain  Neuromusculoskeletal  Movement Related Activities and Participation Affected:  General Tasks and Demands  [address] [place] [address] and [address] [facility]   Number of elements (examined above) that affect the Plan of Care: 1-2: LOW COMPLEXITY   CLINICAL PRESENTATION:   Presentation: Stable and uncomplicated: LOW COMPLEXITY   CLINICAL DECISION MAKING:   Use of outcome tool(s) and clinical judgement create a POC that gives a: Clear prediction of patient's progress: LOW COMPLEXITY

## 2021-03-05 ENCOUNTER — HOSPITAL ENCOUNTER (OUTPATIENT)
Dept: PHYSICAL THERAPY | Age: 36
Discharge: HOME | End: 2021-03-05

## 2021-03-05 PROCEDURE — 97110 THERAPEUTIC EXERCISES: CPT

## 2021-03-05 NOTE — PROGRESS NOTES
Ninfa Up  : 1985  Payor: /  [id number]  at [facility]  [address], [address]  [phone]   [phone]      OUTPATIENT PHYSICAL THERAPY: Daily Treatment Note 3/5/2021  Visit Count:  2    ICD-10: Treatment Diagnosis: left shoulder pain M25.512, left shoulder stiffness  Precautions/Allergies:   Patient has no known allergies. TREATMENT PLAN:  Effective Dates: 3/3/2021 TO 2021 (60 days). Frequency/Duration: 2 times a week for 60 Day(s) MEDICAL/REFERRING DIAGNOSIS:  Pain in left shoulder [M25.512]   DATE OF ONSET:   REFERRING PHYSICIAN: Blake Almendarez MD MD Orders: Santa Llanos and treat  Return MD Appointment: 3/4/2021     Pre-treatment Symptoms/Complaints:  Patient reports being very stiff today. Pain: Initial: Pain Intensity 1: 5  Post Session:  4/10   Medications Last Reviewed:  3/5/2021  Updated Objective Findings:  None Today   TREATMENT:   THERAPEUTIC EXERCISE: (45 minutes):  Exercises per grid below to improve mobility and strength. Required minimal verbal cues to promote proper body mechanics. Progressed resistance, range and repetitions as indicated. Supine wand ER x 20  Seated wand flexion x 20  Rows red theraband x 20  Punches red theraband x 20  ER red theraband x 20   x 20  Shoulder abd standing with red t band x 20   Shoulder pull back with red t band x 20  UBE x 8 min  Scapula retraction exercises with red t band x 20    Manual Therapy (  na    ):   na  Therapeutic Modalities ( na    ):na      HEP: As directed. Treatment/Session Summary:    Response to Treatment:  Patient tolerated treatment well today. Good improvement with ROM following treatment. Needs to continue to work on strength. Communication/Consultation:  None today  Equipment provided today:  red theraband  Recommendations/Intent for next treatment session: Next visit will focus on continued ROM and strengthening.   Total Treatment Billable Duration:  45 min  PT Patient Time In/Time Out  Time In: 0845  Time Out: Ki Galan    Future Appointments   Date Time Provider Simran Mckeon   3/9/2021  9:30 AM [facility]

## 2021-03-09 ENCOUNTER — HOSPITAL ENCOUNTER (OUTPATIENT)
Dept: PHYSICAL THERAPY | Age: 36
Discharge: HOME | End: 2021-03-09

## 2021-03-09 PROCEDURE — 97110 THERAPEUTIC EXERCISES: CPT

## 2021-03-09 NOTE — PROGRESS NOTES
Nato Fishman  : 1985  Payor: /  [id number] [place]  at [facility]  [address], [address]  [phone]   [phone]      OUTPATIENT PHYSICAL THERAPY: Daily Treatment Note 3/9/2021  Visit Count:  3    ICD-10: Treatment Diagnosis: left shoulder pain M25.512, left shoulder stiffness  Precautions/Allergies:   Patient has no known allergies. TREATMENT PLAN:  Effective Dates: 3/3/2021 TO 2021 (60 days). Frequency/Duration: 2 times a week for 60 Day(s) MEDICAL/REFERRING DIAGNOSIS:  Pain in left shoulder [M25.512]   DATE OF ONSET:   REFERRING PHYSICIAN: Justine Schaefer MD MD Orders: Eval and treat  Return MD Appointment: 3/4/2021     Pre-treatment Symptoms/Complaints:  Patient reports still having some tightness in his bicep area. Pain: Initial: Pain Intensity 1: 4  Post Session:  3/10   Medications Last Reviewed:  3/9/2021  Updated Objective Findings:  None Today   TREATMENT:   THERAPEUTIC EXERCISE: (45 minutes):  Exercises per grid below to improve mobility and strength. Required minimal verbal cues to promote proper body mechanics. Progressed resistance, range and repetitions as indicated. Supine wand ER x 20  Seated wand flexion x 20  Rows red theraband x 20  Punches red theraband x 20  ER red theraband x 20   x 20  Shoulder abd standing with red t band x 20   Shoulder pull back with red t band x 20  UBE x 8 min  Scapula retraction exercises with red t band x 20  Bicep # 20 x 20    Manual Therapy (  na    ):   na  Therapeutic Modalities ( na    ):na      HEP: As directed. Treatment/Session Summary:    Response to Treatment:  Patient tolerated treatment well today. Good improvement with ROM following treatment. Patient seems pleased with progress.    Communication/Consultation:  None today  Equipment provided today:  red theraband  Recommendations/Intent for next treatment session: Next visit will focus on continued ROM and strengthening.   Total Treatment Billable Duration:  45 min  PT Patient Time In/Time Out  Time In: 0930  Time Out: [address] [place]    Future Appointments   Date Time Provider Simran Mckeon   3/15/2021  1:00 PM [facility]   3/18/2021  1:00 PM [facility]

## 2021-03-15 ENCOUNTER — APPOINTMENT (OUTPATIENT)
Dept: PHYSICAL THERAPY | Age: 36
End: 2021-03-15

## 2021-03-18 ENCOUNTER — HOSPITAL ENCOUNTER (OUTPATIENT)
Dept: PHYSICAL THERAPY | Age: 36
Discharge: HOME | End: 2021-03-18

## 2021-03-18 PROCEDURE — 97110 THERAPEUTIC EXERCISES: CPT

## 2021-03-18 NOTE — PROGRESS NOTES
Raúl Sanjana  : 1985  Payor: /  [id number]  at [facility]  [address], [address]  [phone]   [phone]      OUTPATIENT PHYSICAL THERAPY: Daily Treatment Note 3/18/2021  Visit Count:  4    ICD-10: Treatment Diagnosis: left shoulder pain M25.512, left shoulder stiffness  Precautions/Allergies:   Patient has no known allergies. TREATMENT PLAN:  Effective Dates: 3/3/2021 TO 2021 (60 days). Frequency/Duration: 2 times a week for 60 Day(s) MEDICAL/REFERRING DIAGNOSIS:  Pain in left shoulder [M25.512]   DATE OF ONSET:   REFERRING PHYSICIAN: Jessica Rodriguez MD MD Orders: Ammy Ball and treat  Return MD Appointment: 3/4/2021     Pre-treatment Symptoms/Complaints:  Patient reports he has pain for a couple of days after last visit. Pain: Initial: Pain Intensity 1: 3  Post Session:  2/10   Medications Last Reviewed:  3/18/2021  Updated Objective Findings:  None Today   TREATMENT:   THERAPEUTIC EXERCISE: (45 minutes):  Exercises per grid below to improve mobility and strength. Required minimal verbal cues to promote proper body mechanics. Progressed resistance, range and repetitions as indicated. Supine wand ER x 20  Seated wand flexion x 20  Rows red theraband x 20  Punches red theraband x 20  ER red theraband x 20   x 20  Shoulder abd standing with red t band x 20   Shoulder pull back with red t band x 20  UBE x 8 min  Scapula retraction exercises with red t band x 20  Bicep # 20 x 20  Chest press # 2 2 x 10    Manual Therapy (  na    ):   na  Therapeutic Modalities ( na    ):na      HEP: As directed. Treatment/Session Summary:    Response to Treatment:  Patient tolerated treatment well today. Patient is scheduled to see the doctor next week before returning to work.     Communication/Consultation:  None today  Equipment provided today:  red thersunita  Recommendations/Intent for next treatment session: Next visit will focus on continued ROM and strengthening.   Total Treatment Billable Duration:  45 min     Tio Boateng    Future Appointments   Date Time Provider Simran Mckeon   3/22/2021  8:45 AM Alicia Alberto [facility]

## 2021-03-19 ENCOUNTER — APPOINTMENT (OUTPATIENT)
Dept: PHYSICAL THERAPY | Age: 36
End: 2021-03-19

## 2021-03-22 ENCOUNTER — HOSPITAL ENCOUNTER (OUTPATIENT)
Dept: PHYSICAL THERAPY | Age: 36
Discharge: HOME | End: 2021-03-22

## 2021-03-22 PROCEDURE — 97110 THERAPEUTIC EXERCISES: CPT

## 2021-03-22 NOTE — PROGRESS NOTES
Juanita Nicole  : 1985  Payor: /  [address] [place]  at [facility] Therapy  [address], [address]  [phone]   [phone]      OUTPATIENT PHYSICAL THERAPY: Daily Treatment Note 3/22/2021  Visit Count:  5    ICD-10: Treatment Diagnosis: left shoulder pain M25.512, left shoulder stiffness  Precautions/Allergies:   Patient has no known allergies. TREATMENT PLAN:  Effective Dates: 3/3/2021 TO 2021 (60 days). Frequency/Duration: 2 times a week for 60 Day(s) MEDICAL/REFERRING DIAGNOSIS:  Pain in left shoulder [M25.512]   DATE OF ONSET:   REFERRING PHYSICIAN: Gasper Jung MD MD Orders: Prudence Rodriguez and treat  Return MD Appointment: 3/4/2021     Pre-treatment Symptoms/Complaints:  Patient reports feeling better just some soreness. Pain: Initial: Pain Intensity 1: 3  Post Session:  2/10   Medications Last Reviewed:  3/22/2021  Updated Objective Findings:  None Today   TREATMENT:   THERAPEUTIC EXERCISE: (45 minutes):  Exercises per grid below to improve mobility and strength. Required minimal verbal cues to promote proper body mechanics. Progressed resistance, range and repetitions as indicated. Supine wand ER x 20  Seated wand flexion x 20  Rows red theraband x 20  Punches red theraband x 20  ER red theraband x 20   x 20  Shoulder abd standing with red t band x 20   Shoulder pull back with red t band x 20  UBE x 8 min  Scapula retraction exercises with red t band x 20  Bicep # 20 x 20  Chest press # 2 2 x 10    Manual Therapy (  na    ):   na  Therapeutic Modalities ( na    ):na      HEP: As directed. Treatment/Session Summary:    Response to Treatment:  Patient tolerated treatment well today. Patient  Continues to gain good improvement with therapy.   Communication/Consultation:  None today  Equipment provided today:  red theraband  Recommendations/Intent for next treatment session: Next visit will focus on continued ROM and strengthening.   Total Treatment Billable Duration:  45 min  PT Patient Time In/Time Out  Time In: 0845  Time Out: Letitia Hong [facility] [place]    Future Appointments   Date Time Provider Simran Mckeon   3/29/2021  8:45 AM Dipika Couch [facility]   4/1/2021  8:45 AM Dipika Couch [facility]

## 2021-03-24 ENCOUNTER — HOSPITAL ENCOUNTER (OUTPATIENT)
Dept: PHYSICAL THERAPY | Age: 36
Discharge: HOME | End: 2021-03-24

## 2021-03-24 PROCEDURE — 97110 THERAPEUTIC EXERCISES: CPT

## 2021-03-24 NOTE — PROGRESS NOTES
Kobi Coughlin  : 1985  Payor: /  [address] [place]  at [place] [address] Therapy  [address], [address]  [phone]   [phone]      OUTPATIENT PHYSICAL THERAPY: Daily Treatment Note 3/24/2021  Visit Count:  6    ICD-10: Treatment Diagnosis: left shoulder pain M25.512, left shoulder stiffness  Precautions/Allergies:   Patient has no known allergies. TREATMENT PLAN:  Effective Dates: 3/3/2021 TO 2021 (60 days). Frequency/Duration: 2 times a week for 60 Day(s) MEDICAL/REFERRING DIAGNOSIS:  Pain in left shoulder [M25.512]   DATE OF ONSET:   REFERRING PHYSICIAN: Moustapha Hill MD MD Orders: Zuri Jean Baptiste and treat  Return MD Appointment: 3/4/2021     Pre-treatment Symptoms/Complaints:  Patient reports feeling soreness in the shoulder. Pain: Initial: Pain Intensity 1: 3  Post Session:  2/10   Medications Last Reviewed:  3/24/2021  Updated Objective Findings:  None Today   TREATMENT:   THERAPEUTIC EXERCISE: (44 minutes):  Exercises per grid below to improve mobility and strength. Required minimal verbal cues to promote proper body mechanics. Progressed resistance, range and repetitions as indicated. Scaption 3# 2 x 15  Standing flexion to 90 3# 2 x 15  Rows green theraband x 20  Punches red theraband x 20  ER red theraband x 30  Prone rows 8# 2 x 15  Prone extension 4# 2 x 15  Prone h. abd 2 x 10  UBE x 6 min  Bicep # 10 x 20-single arm  Chest press # 2 2 x 10-held  Body blade into flex, ER, abd x 30 sec each  Shoulder ER combined with abduction 3# 2 x 15      Manual Therapy (  na    ): na  na  Therapeutic Modalities ( na    ):na      HEP: As directed. Treatment/Session Summary:    Response to Treatment:  Patient reporting feeling most pain with flexion combined with abduction and ER. He is able to elevate arm overhead, but does report some pain. Weakness still present in cuff and surrounding shoulder and scapular muscles.   Prone horizontal abduction is difficut for pt. Communication/Consultation:  None today  Equipment provided today:  red theraband  Recommendations/Intent for next treatment session: Next visit will focus on continued ROM and strengthening.   Total Treatment Billable Duration:  44 min  PT Patient Time In/Time Out  Time In: 0845  Time Out: [address], PT    Future Appointments   Date Time Provider Simran Mckeon   3/29/2021  8:45 AM [facility]   4/1/2021  8:45 AM Gabbie Hidalgo, PT [facility]

## 2021-03-26 PROBLEM — M25.512 LEFT SHOULDER PAIN: Status: ACTIVE | Noted: 2021-03-26

## 2021-03-29 ENCOUNTER — HOSPITAL ENCOUNTER (OUTPATIENT)
Dept: PHYSICAL THERAPY | Age: 36
Discharge: HOME | End: 2021-03-29

## 2021-03-29 NOTE — PROGRESS NOTES
Kalyan Lanier  : 1985  Primary:   Secondary:  [facility]  [address], [address]  [phone]   [phone]      OUTPATIENT DAILY NOTE    NAME/AGE/GENDER: Kalyan Lanier is a 43 y.o. male. DATE: 3/29/2021    Mr. Duke Reading for today's appointment due to illness. Anastasiya Prince, PTA

## 2021-04-01 ENCOUNTER — HOSPITAL ENCOUNTER (OUTPATIENT)
Dept: PHYSICAL THERAPY | Age: 36
Discharge: HOME | End: 2021-04-01

## 2021-04-01 PROCEDURE — 97110 THERAPEUTIC EXERCISES: CPT

## 2021-04-01 NOTE — PROGRESS NOTES
Lucio Bhakta  : 1985  Payor: /  [id number]  at [facility] Therapy  [address], [address]  [phone]   [phone]         OUTPATIENT PHYSICAL THERAPY:Progress Report 2021   ICD-10: Treatment Diagnosis: left shoulder pain M25.512, left shoulder stiffness  Precautions/Allergies:   Patient has no known allergies. TREATMENT PLAN:  Effective Dates: 3/3/2021 TO 2021 (60 days). Frequency/Duration: 2 times a week for 60 Day(s) and upon reassessment, will adjust frequency and duration as progress indicates. MEDICAL/REFERRING DIAGNOSIS:  Pain in left shoulder [M25.512]   DATE OF ONSET: fall on 2021  REFERRING PHYSICIAN: Shari Salazar MD MD Orders: Aviva Crutch and treat  Return MD Appointment: 3/4/2021     INITIAL ASSESSMENT:  Mr. Erica Curry presents with increased left shoulder pain after tripping and falling on entryway leading into his apartment building. Accident occurred on 2021 and pt reported feeling increased pain roughly 2 days later with loss of motion and strength. Pt seen in ER and was placed in sling. X-rays were negative. Pt continued to have pain and he saw ortho. Further x-ray and MRI revealed small fracture. Pt presents today with loss of motion and strength and inability to use the arm for ADL's and for work tasks like reaching, lifting, pushing and pulling. Pt will benefit from skilled therapy to address his weakness as well as loss of motion and allow him to return to all prior activities. Assessment as of 2021:  Pt has attended therapy for ROM and strengthening of the left shoulder. He is reporting mild pain with elevation of the arm. He has noted improvement, but the pain is still present. He has progressed AROM to the following:  Flex 146 °, abd 147 °, ER 50 °, IR 58 °. Strength as follows: flex 4/5 with pain, abd 4/5 with pain, ER 4/5 with pain and IR 4+/5 with pain.   We are working on overall shoulder strength in various muscle groups and ROM to achieve full overhead shoulder motion. PROBLEM LIST (Impacting functional limitations):  1. Decreased Strength  2. Decreased ADL/Functional Activities  3. Increased Pain  4. Decreased Flexibility/Joint Mobility INTERVENTIONS PLANNED: (Treatment may consist of any combination of the following)  1. Cold  2. Heat  3. Home Exercise Program (HEP)  4. Manual Therapy  5. Range of Motion (ROM)  6. Therapeutic Exercise/Strengthening     GOALS: (Goals have been discussed and agreed upon with patient.)  Short-Term Functional Goals: Time Frame: 4 weeks  1. Establish independent HEP with no cuing.-met  2. Pt will be able to gain 10 degrees of active motion for overhead reaching.-me  3. Pt will be able to report ability to sleep on either side.-met  4. Pt will be able to gain 1/2 grade increase in strength required for lifting 10 plus pound objects.-met  Discharge Goals: Time Frame: 8 weeks (60 days)  1. Pt will be able to improve score on [place] by at least 5 points for advanced ADL's.-met  2. Pt will be able to report ability to lift and carry at least 30 pounds.-in progress  3. Pt will be able to restore motion to [facility] for L UE for all reaching.-in progress  4. Pt will be able to report ability to work overhead for up to 1-2 minutes.-in progress    OUTCOME MEASURE:   Tool Used: Dmitri  Score:  Initial: 31/100 Most Recent: 61 (Date: 4/1/2021 )   Interpretation of Score:   Interpretation of Score: 20 questions each scored on a 3 point scale with 0 representing \"extreme difficulty or unable to perform\" and 3 representing \"no difficulty\", 3 questions each scored from 0-10 about pain, and 1 question scored 0-10 about function of the shoulder  The lower the score, the greater the functional disability. 100/100 represents no disability, pain or loss of function.  Minimal clinically important difference is 11.4.  Minimal detectable change is 12.1    Outcome Measure Conversion Site          MEDICAL NECESSITY:   · Patient is expected to demonstrate progress in strength and range of motion to improve his overall ability to use the left arm for ADL's to include reaching, lifting, pushing and pulling. REASON FOR SERVICES/OTHER COMMENTS:  · Patient continues to require skilled intervention due to lack of full left shoulder ROM and strength needed for ADL's and work tasks to include repetitive overhead work as well as sustained overhead work. Total Duration:  PT Patient Time In/Time Out  Time In: 0845  Time Out: 0930    Rehabilitation Potential For Stated Goals: Good  Regarding Joann Zhu's therapy, I certify that the treatment plan above will be carried out by a therapist or under their direction.   Thank you for this referral,  Jessie Jacinto, PT

## 2021-04-05 ENCOUNTER — HOSPITAL ENCOUNTER (OUTPATIENT)
Dept: PHYSICAL THERAPY | Age: 36
Discharge: HOME | End: 2021-04-05

## 2021-04-05 PROCEDURE — 97110 THERAPEUTIC EXERCISES: CPT

## 2021-04-05 NOTE — PROGRESS NOTES
Rosa Bui  : 1985  Payor: /  [id number] [place]  at [place] [address] Therapy  [address], [address]  [phone]   [phone]      OUTPATIENT PHYSICAL THERAPY: Daily Treatment Note 2021  Visit Count:  8    ICD-10: Treatment Diagnosis: left shoulder pain M25.512, left shoulder stiffness  Precautions/Allergies:   Patient has no known allergies. TREATMENT PLAN:  Effective Dates: 3/3/2021 TO 2021 (60 days). Frequency/Duration: 2 times a week for 60 Day(s) MEDICAL/REFERRING DIAGNOSIS:  Pain in left shoulder [M25.512]   DATE OF ONSET:   REFERRING PHYSICIAN: Augustus Son MD MD Orders: Eval and treat  Return MD Appointment: 3/4/2021     Pre-treatment Symptoms/Complaints:  Patient reports no increased pain with shoulder at rest.  Pain: Initial: Pain Intensity 1: 0  Post Session:  0/10   Medications Last Reviewed:  2021  Updated Objective Findings:  None Today   TREATMENT:   THERAPEUTIC EXERCISE: (44 minutes):  Exercises per grid below to improve mobility and strength. Required minimal verbal cues to promote proper body mechanics. Progressed resistance, range and repetitions as indicated. Scaption 3# 2 x 15  Standing flexion to 90 3# 2 x 15  ER 5# cables x 30  Bent over rows 10# 2 x 15  Prone extension 5# 2 x 15  Prone h. abd 3 x 10 3#  UBE x 6 min  Bicep # 30 x 20-double arm-held  Chest press # 5 2 x 15-held  Body blade into flex, ER, abd x 30 sec each  IR 12# x 30  Punches 15# x 30 use of cables  Standing rows with cables 30# x 30  Shoulder shoulder elevation red theraband reaching overhead 2 x 10  SA work against wall with green TB x 20  Overhead wall washing side to side 2 x 1 minute of work    Manual Therapy (  na    ): na  na  Therapeutic Modalities ( na    ):na      HEP: As directed. Treatment/Session Summary:    Response to Treatment:  Patient reporting feeling mild soreness with overhead sustained positions.   He was able to perform up to one minute of overhead sustained work and then rested and performed an additional set. He reported he could feel some soreness in the shoulder with this exercise. Pt was also progressed to cable column today for increased resistance on severl exercises. Communication/Consultation:  None today  Equipment provided today:  None today  Recommendations/Intent for next treatment session: Next visit will focus on continued ROM and strengthening.   Total Treatment Billable Duration:  44 min  PT Patient Time In/Time Out  Time In: 1230  Time Out: [address], PT    Future Appointments   Date Time Provider Simran Mckeon   4/12/2021 11:00 AM [facility]

## 2021-04-12 ENCOUNTER — HOSPITAL ENCOUNTER (OUTPATIENT)
Dept: PHYSICAL THERAPY | Age: 36
Discharge: HOME | End: 2021-04-12

## 2021-04-12 PROCEDURE — 97110 THERAPEUTIC EXERCISES: CPT

## 2021-04-12 NOTE — PROGRESS NOTES
Kathryn Mcdaniels  : 1985  Payor: /  [id number] [place]  at [facility] Therapy  [address], [facility], [address]  [phone]   [phone]      OUTPATIENT PHYSICAL THERAPY: Daily Treatment Note 2021  Visit Count:  9    ICD-10: Treatment Diagnosis: left shoulder pain M25.512, left shoulder stiffness  Precautions/Allergies:   Patient has no known allergies. TREATMENT PLAN:  Effective Dates: 3/3/2021 TO 2021 (60 days). Frequency/Duration: 2 times a week for 60 Day(s) MEDICAL/REFERRING DIAGNOSIS:  Pain in left shoulder [M25.512]   DATE OF ONSET:   REFERRING PHYSICIAN: Primo Guevara MD MD Orders: [employer] and treat  Return MD Appointment: 3/4/2021     Pre-treatment Symptoms/Complaints:  Patient reports shoulder still hurts with certain movements. Pain: Initial: Pain Intensity 1: 3  Post Session:  0/10   Medications Last Reviewed:  2021  Updated Objective Findings:  None Today   TREATMENT:   THERAPEUTIC EXERCISE: (45 minutes):  Exercises per grid below to improve mobility and strength. Required minimal verbal cues to promote proper body mechanics. Progressed resistance, range and repetitions as indicated. Scaption 3# 2 x 15  Standing flexion to 90 3# 2 x 15  ER 5# cables x 30  Bent over rows 10# 2 x 15  Prone extension 5# 2 x 15  Prone h. abd 3 x 10 3#  UBE x 6 min  Bicep # 30 x 20-double arm-held  Chest press # 5 2 x 15-held  Body blade into flex, ER, abd x 30 sec each  IR 12# x 30  Punches 15# x 30 use of cables  Standing rows with cables 30# x 30  Shoulder shoulder elevation red theraband reaching overhead 2 x 10  SA work against wall with green TB x 20  Overhead wall washing side to side 2 x 1 minute of work    Manual Therapy (  na    ): na  na  Therapeutic Modalities ( na    ):na      HEP: As directed. Treatment/Session Summary:    Response to Treatment:  Patient tolerated treatment well.  Patient continues have some discomfort with certain movements, but overall feels like shoulder has improved. Communication/Consultation:  None today  Equipment provided today:  None today  Recommendations/Intent for next treatment session: Next visit will focus on continued ROM and strengthening. Total Treatment Billable Duration:  45 min  PT Patient Time In/Time Out  Time In: 1100  Time Out: Robert Rhoades    No future appointments.

## 2021-04-16 ENCOUNTER — APPOINTMENT (OUTPATIENT)
Dept: PHYSICAL THERAPY | Age: 36
End: 2021-04-16

## 2021-04-19 ENCOUNTER — HOSPITAL ENCOUNTER (OUTPATIENT)
Dept: PHYSICAL THERAPY | Age: 36
Discharge: HOME | End: 2021-04-19

## 2021-04-19 NOTE — PROGRESS NOTES
John Dan  : 1985  Primary:   Secondary:  Therapy Center at [facility] Therapy  [address], [address]  [phone]   [phone]      OUTPATIENT DAILY NOTE    NAME/AGE/GENDER: John Dan is a 43 y.o. male. DATE: 2021    . Lupillo Eid for today's appointment due to having to take his dog to vet.  Sierra Landon, PTA

## 2021-04-21 ENCOUNTER — HOSPITAL ENCOUNTER (OUTPATIENT)
Dept: PHYSICAL THERAPY | Age: 36
Discharge: HOME | End: 2021-04-21

## 2021-04-26 ENCOUNTER — HOSPITAL ENCOUNTER (OUTPATIENT)
Dept: PHYSICAL THERAPY | Age: 36
Discharge: HOME | End: 2021-04-26

## 2021-04-26 PROCEDURE — 97110 THERAPEUTIC EXERCISES: CPT

## 2021-04-26 NOTE — PROGRESS NOTES
Jaya Corado  : 1985  Payor: /  [address] [place]  at [facility] Therapy  [address], [address], [address]  [phone]   [phone]      OUTPATIENT PHYSICAL THERAPY: Daily Treatment Note 2021  Visit Count:  10    ICD-10: Treatment Diagnosis: left shoulder pain M25.512, left shoulder stiffness  Precautions/Allergies:   Patient has no known allergies. TREATMENT PLAN:  Effective Dates: 3/3/2021 TO 2021 (60 days). Frequency/Duration: 2 times a week for 60 Day(s) MEDICAL/REFERRING DIAGNOSIS:  Pain in left shoulder [M25.512]   DATE OF ONSET:   REFERRING PHYSICIAN: Xuan Buckley MD MD Orders: Eval and treat  Return MD Appointment: 3/4/2021     Pre-treatment Symptoms/Complaints:  Patient reports shoulder has been very stiff for the last couple of days. Pain: Initial: Pain Intensity 1: 4  Post Session:  3/10   Medications Last Reviewed:  2021  Updated Objective Findings:  None Today   TREATMENT:   THERAPEUTIC EXERCISE: (45 minutes):  Exercises per grid below to improve mobility and strength. Required minimal verbal cues to promote proper body mechanics. Progressed resistance, range and repetitions as indicated. Scaption 3# 2 x 15  Standing flexion to 90 3# 2 x 15  ER 5# cables x 30  Bent over rows 10# 2 x 15  Prone extension 5# 2 x 15  Prone h. abd 3 x 10 3#  UBE x 6 min  Bicep # 30 x 20-double arm-held  Chest press # 5 2 x 15-held  Body blade into flex, ER, abd x 30 sec each  IR 12# x 30  Punches 15# x 30 use of cables  Standing rows with cables 30# x 30  Shoulder shoulder elevation red theraband reaching overhead 2 x 10  SA work against wall with green TB x 20  Overhead wall washing side to side 2 x 1 minute of work    Manual Therapy (  na    ): na  na  Therapeutic Modalities ( na    ):na      HEP: As directed. Treatment/Session Summary:    Response to Treatment:  Patient tolerated treatment with mild discomfort.  Still some point tenderness with certain movements. Communication/Consultation:  None today  Equipment provided today:  None today  Recommendations/Intent for next treatment session: Next visit will focus on continued ROM and strengthening.   Total Treatment Billable Duration:  45 min  PT Patient Time In/Time Out  Time In: 0400  Time Out: 401 [facility], \[facility] [place] [facility]\""    Future Appointments   Date Time Provider Simran Linda   6/4/2021  9:30 AM MD SHRUTI Askew

## 2021-04-28 ENCOUNTER — HOSPITAL ENCOUNTER (OUTPATIENT)
Dept: PHYSICAL THERAPY | Age: 36
Discharge: HOME | End: 2021-04-28

## 2021-04-28 NOTE — THERAPY EVALUATION
Flori Kwan : 1985 Payor:   [id number]  at [address] 
[address], [facility] [address] [phone]   [phone] OUTPATIENT PHYSICAL THERAPY: Daily Treatment Note 2021 Pt called to cancel appointment today. Will follow up at next scheduled appointment. Kevin Rojas, PT Future Appointments Date Time Provider Simran Lockharti 2021 12:30 PM Anthony Robbins PT [facility]  
2021  9:30 AM MD SHRUTI Chilel

## 2021-05-10 ENCOUNTER — HOSPITAL ENCOUNTER (OUTPATIENT)
Dept: PHYSICAL THERAPY | Age: 36
Discharge: HOME | End: 2021-05-10

## 2021-05-10 NOTE — PROGRESS NOTES
Vandana Campbell  : 1985  Payor:   [id number] [place]  at [facility] Therapy  [address], [facility] [address]  [phone]   [phone]      OUTPATIENT PHYSICAL THERAPY: Daily Treatment Note 5/10/2021    Pt called to cancel appointment today due to personal reasons. Will follow up at next scheduled appointment.     Gladys Borja, PT    Future Appointments   Date Time Provider Simran Mckeon   5/10/2021  9:30 AM Brittani Day, PT [facility]   2021  9:30 AM MD SHRUTI Courtney

## 2021-05-12 ENCOUNTER — HOSPITAL ENCOUNTER (OUTPATIENT)
Dept: PHYSICAL THERAPY | Age: 36
Discharge: HOME | End: 2021-05-12

## 2021-05-12 NOTE — PROGRESS NOTES
Norma Becker  : 1985  Primary:   Secondary:  Therapy Center at [facility] Therapy  [address], [facility] [address]  [phone]   [phone]      OUTPATIENT DAILY NOTE    NAME/AGE/GENDER: Norma Becker is a 43 y.o. male. DATE: 2021    Mr. Tricia Ledesma for today's appointment due to not having enough gas to make it to his appointment. Nuvia De Jesus, PTA

## 2021-05-14 ENCOUNTER — HOSPITAL ENCOUNTER (OUTPATIENT)
Dept: PHYSICAL THERAPY | Age: 36
Discharge: HOME | End: 2021-05-14

## 2021-05-14 PROCEDURE — 97110 THERAPEUTIC EXERCISES: CPT

## 2021-05-14 NOTE — PROGRESS NOTES
Annika Stevens  : 1985  Payor: /  [address] [place]  at [facility] Therapy  [address], [facility], [address]  [phone]   [phone]      OUTPATIENT PHYSICAL THERAPY: Daily Treatment Note 2021  Visit Count:  11    ICD-10: Treatment Diagnosis: left shoulder pain M25.512, left shoulder stiffness  Precautions/Allergies:   Patient has no known allergies. TREATMENT PLAN:  Effective Dates: 2021 TO 2021 (60 days). Frequency/Duration: 2 times a week for 60 Day(s) MEDICAL/REFERRING DIAGNOSIS:  Pain in left shoulder [M25.512]   DATE OF ONSET:   REFERRING PHYSICIAN: Anna Ellsworth MD MD Orders: Clarence Daniela and treat  Return MD Appointment: 3/4/2021     Pre-treatment Symptoms/Complaints:  Patient reports shoulder is still tight with certain movements. Pain: Initial: Pain Intensity 1: 2  Post Session:  2/10   Medications Last Reviewed:  2021  Updated Objective Findings:  None Today   TREATMENT:   THERAPEUTIC EXERCISE: (45 minutes):  Exercises per grid below to improve mobility and strength. Required minimal verbal cues to promote proper body mechanics. Progressed resistance, range and repetitions as indicated. Scaption 4# 2 x 15  Standing flexion to 90 4# 2 x 15  Bent over rows 10# 2 x 15  Prone h. abd 3 x 10 4#  UBE x 6 min  Bicep # 30 x 20-double arm-held  Body blade into flex, ER, abd x 30 sec each-held  Shoulder pull downs 35# x 30  High rows with cables 30# x 30  Overhead y's red TB x 30  IR/ER in 90/90 red TB x 30  Tennis ball against wall-catch/release quickly x 1 min of overhead work  Supine flies # 5 2 x 15  Supine chest press # 2 x 15    Manual Therapy (  na    ): na  na  Therapeutic Modalities ( na    ):na      HEP: As directed. Treatment/Session Summary:    Response to Treatment:  Patient tolerated treatment well.  Patient continues to have discomfort with certain movements, but hopes once he sees doctor on  he will be able to return to more of his prior activities. Communication/Consultation:  None today  Equipment provided today:  None today  Recommendations/Intent for next treatment session: Next visit will focus on continued ROM and strengthening.   Total Treatment Billable Duration:  45 min  PT Patient Time In/Time Out  Time In: 0800  Time Out: 500 MARGARITA Kan, PTA    Future Appointments   Date Time Provider Simran Mckeon   5/19/2021 11:45 AM Edwige Ha, PT [facility]   [facility]26[facility]2021 [facility]   6/2/2021 11:00 AM Edwige Ha PT [facility]   6/4/2021  9:30 AM MD SHRUTI Daly

## 2021-05-19 ENCOUNTER — HOSPITAL ENCOUNTER (OUTPATIENT)
Dept: PHYSICAL THERAPY | Age: 36
Discharge: HOME | End: 2021-05-19

## 2021-05-19 PROCEDURE — 97110 THERAPEUTIC EXERCISES: CPT

## 2021-05-19 NOTE — PROGRESS NOTES
Vandana Adrian  : 1985  Payor: /  [address] [place]  at [facility] Therapy  [address], [facility], [address]  [phone]   [phone]      OUTPATIENT PHYSICAL THERAPY: Daily Treatment Note 2021  Visit Count:  12    ICD-10: Treatment Diagnosis: left shoulder pain M25.512, left shoulder stiffness  Precautions/Allergies:   Patient has no known allergies. TREATMENT PLAN:  Effective Dates: 2021 TO 2021 (60 days). Frequency/Duration: 2 times a week for 60 Day(s) MEDICAL/REFERRING DIAGNOSIS:  Pain in left shoulder [M25.512]   DATE OF ONSET:   REFERRING PHYSICIAN: Elia Valencia MD MD Orders: Eval and treat  Return MD Appointment: 3/4/2021     Pre-treatment Symptoms/Complaints:  Patient reports shoulder pain can still be felt when arm is raised overhead. Pain: Initial: Pain Intensity 1: 2  Post Session:  2/10   Medications Last Reviewed:  2021  Updated Objective Findings:  None Today   TREATMENT:   THERAPEUTIC EXERCISE: (40 minutes):  Exercises per grid below to improve mobility and strength. Required minimal verbal cues to promote proper body mechanics. Progressed resistance, range and repetitions as indicated. Scaption 5# 2 x 15  Standing flexion to 90 5# 2 x 15  Prone h. abd 3 x 10 4#-held  UBE x 6 min  Bicep # 30 x 20-double arm-held  Body blade into flex, ER, abd 2 x 30 sec each-overhead  Shoulder overhead raise green TB 2 x 15  Med ball raise overhead-against wall orange ball 2 x 15  High rows with cables 30# x 30  Overhead y's red TB x 30-held  IR/ER in 90/90 red TB x 30  Tennis ball against wall-catch/release quickly x 1 min of overhead work-held  3 way arm raise on swill ball 2 x 10  Overhead kettleball press  with walk 10# 3 x 30 sec each    Manual Therapy (  na    ): na  na  Therapeutic Modalities ( na    ):na      HEP: As directed.      Treatment/Session Summary:    Response to Treatment:  Patient challenged by repetitive exercises with arm over the head. Trying to simulate overhead working positions for the patient. Shoulder felt fatigued post treatment. Communication/Consultation:  None today  Equipment provided today:  None today  Recommendations/Intent for next treatment session: Next visit will focus on continued ROM and strengthening.   Total Treatment Billable Duration:  40 min  PT Patient Time In/Time Out  Time In: 9335  Time Out: [address] \[facility] [place] [facility]\"", PT    Future Appointments   Date Time Provider [facility]   5/26/2021 11:00 AM [facility]   6/2/2021 11:00 AM Randi Lowery, PT [facility]   6/4/2021  9:30 AM MD SHRUTI Mariee

## 2021-05-26 ENCOUNTER — HOSPITAL ENCOUNTER (OUTPATIENT)
Dept: PHYSICAL THERAPY | Age: 36
Discharge: HOME | End: 2021-05-26

## 2021-05-26 PROCEDURE — 97110 THERAPEUTIC EXERCISES: CPT

## 2021-05-26 NOTE — PROGRESS NOTES
Kenna Fitzpatrick  : 1985  Payor: /  [address] [place]  at [facility] Therapy  [address], [facility], [address]  [phone]   [phone]      OUTPATIENT PHYSICAL THERAPY: Daily Treatment Note 2021  Visit Count:  13    ICD-10: Treatment Diagnosis: left shoulder pain M25.512, left shoulder stiffness  Precautions/Allergies:   Patient has no known allergies. TREATMENT PLAN:  Effective Dates: 2021 TO 2021 (60 days). Frequency/Duration: 2 times a week for 60 Day(s) MEDICAL/REFERRING DIAGNOSIS:  Pain in left shoulder [M25.512]   DATE OF ONSET:   REFERRING PHYSICIAN: Michael Garcia MD MD Orders: Eval and treat  Return MD Appointment: 3/4/2021     Pre-treatment Symptoms/Complaints:  Patient reports shoulder is doing better still some discomfort with certain movements. Pain: Initial: Pain Intensity 1: 2  Post Session:  2/10   Medications Last Reviewed:  2021  Updated Objective Findings:  None Today   TREATMENT:   THERAPEUTIC EXERCISE: (45 minutes):  Exercises per grid below to improve mobility and strength. Required minimal verbal cues to promote proper body mechanics. Progressed resistance, range and repetitions as indicated. Scaption 5# 2 x 15  Standing flexion to 90 5# 2 x 15  Prone h. abd 3 x 10 4#-held  UBE x 6 min  Bicep # 30 x 20-double arm-  Body blade into flex, ER, abd 2 x 30 sec each-overhead  Shoulder overhead raise green TB 2 x 15  Med ball raise overhead-against wall orange ball 2 x 15  High rows with cables 30# x 30  Overhead y's red TB x 30-held  IR/ER in 90/90 red TB x 30  Tennis ball against wall-catch/release quickly x 1 min of overhead work  3 way arm raise on swill ball 2 x 10  Overhead kettleball press  with walk 10# 3 x 30 sec each  Shoulder press standing # 5 2 x 10    Manual Therapy (  na    ): na  na  Therapeutic Modalities ( na    ):na      HEP: As directed.      Treatment/Session Summary:    Response to Treatment:  Patient demonstrated good effort with all exercises. Better exercise endurance today. Patient is scheduled to see the doctor next week. Communication/Consultation:  None today  Equipment provided today:  None today  Recommendations/Intent for next treatment session: Next visit will focus on continued ROM and strengthening.   Total Treatment Billable Duration:  45 min  PT Patient Time In/Time Out  Time In: 1100  Time Out: 325 [place], [place]    Future Appointments   Date Time Provider Simran Gironisti   6/2/2021 11:00 AM Enma Poole PT [facility]   6/4/2021  9:30 AM Jose Bhakta MD [facility] [place]

## 2021-06-02 ENCOUNTER — HOSPITAL ENCOUNTER (OUTPATIENT)
Dept: PHYSICAL THERAPY | Age: 36
Discharge: HOME | End: 2021-06-02

## 2021-06-02 PROCEDURE — 97110 THERAPEUTIC EXERCISES: CPT

## 2021-06-02 NOTE — PROGRESS NOTES
Emy Adams  : 1985  Payor: /  [id number]  at [facility]  [address], [facility], [address]  [phone]   [phone]      OUTPATIENT PHYSICAL THERAPY: Daily Treatment Note 2021  Visit Count:  14    ICD-10: Treatment Diagnosis: left shoulder pain M25.512, left shoulder stiffness  Precautions/Allergies:   Patient has no known allergies. TREATMENT PLAN:  Effective Dates: 2021 TO 2021 (60 days). Frequency/Duration: 2 times a week for 60 Day(s) MEDICAL/REFERRING DIAGNOSIS:  Pain in left shoulder [M25.512]   DATE OF ONSET:   REFERRING PHYSICIAN: Ivanna Park MD MD Orders: Coral Pac and treat  Return MD Appointment: 3/4/2021     Pre-treatment Symptoms/Complaints:  Patient reporting shoulder is slightly sore as he did some swimming. Pain: Initial: Pain Intensity 1: 3  Post Session:  2/10   Medications Last Reviewed:  2021  Updated Objective Findings:  None Today   TREATMENT:   THERAPEUTIC EXERCISE: (39 minutes):  Exercises per grid below to improve mobility and strength. Required minimal verbal cues to promote proper body mechanics. Progressed resistance, range and repetitions as indicated.     Scaption 5# 2 x 15  Standing flexion to 90 5# 2 x 15  Double arm rows 45# x 30  UBE x 6 min  Bicep # 30 x 20-double arm-held  Body blade into flex, ER, abd 2 x 30 sec each-overhead  Shoulder overhead raise green TB 2 x 15  Med ball raise overhead-against wall orange ball 2 x 15  High rows with cables 30# x 30  Overhead y's red TB x 30  IR/ER in 90/90 red TB x 30  Tennis ball against wall-catch/release quickly x 1 min of overhead work  3 way arm raise on swill ball 2 x 10  Overhead kettleball press  with walk 10# 3 x 30 sec each-held  Shoulder overhead flexion red TB x 30  Push ups on table 2 x 10  Shoulder touches 2 x 10  Shoulder press standing # 5 2 x 10    Manual Therapy (  na    ): na  na  Therapeutic Modalities ( na ):na      HEP: As directed. Treatment/Session Summary:    Response to Treatment:  Patient presenting with arm fatigue post treatment session. Mild soreness at shoulder at times with certain motions. Progressing well and will see Dr. Yayo Franklin on Friday. Therapy to be determined after he sees MD.  Communication/Consultation:  None today  Equipment provided today:  None today  Recommendations/Intent for next treatment session: Next visit will focus on continued ROM and strengthening.   Total Treatment Billable Duration:  39 min  PT Patient Time In/Time Out  Time In: 1100  Time Out: [facility], PT    Future Appointments   Date Time Provider Simarn Lockahrti   6/4/2021  9:30 AM MD SHRUTI Holliday

## 2021-06-03 PROBLEM — M79.641 RIGHT HAND PAIN: Status: ACTIVE | Noted: 2017-05-15

## 2021-06-03 PROBLEM — S52.124A CLOSED NONDISPLACED FRACTURE OF HEAD OF RIGHT RADIUS: Status: ACTIVE | Noted: 2017-05-15

## 2021-08-22 PROBLEM — F90.0 ATTENTION DEFICIT HYPERACTIVITY DISORDER, PREDOMINANTLY INATTENTIVE TYPE: Status: ACTIVE | Noted: 2019-07-25

## 2021-08-22 PROBLEM — F41.9 ANXIETY: Status: ACTIVE | Noted: 2019-07-25

## 2021-08-22 PROBLEM — F32.A DEPRESSIVE DISORDER: Status: ACTIVE | Noted: 2019-07-25

## 2021-08-22 PROBLEM — F10.10 ALCOHOL ABUSE: Status: ACTIVE | Noted: 2019-07-25

## 2021-09-07 ENCOUNTER — ANESTHESIA EVENT (OUTPATIENT)
Dept: SURGERY | Age: 36
End: 2021-09-07

## 2021-09-08 ENCOUNTER — HOSPITAL ENCOUNTER (OUTPATIENT)
Age: 36
Setting detail: OUTPATIENT SURGERY
Discharge: HOME | End: 2021-09-08
Attending: ORTHOPAEDIC SURGERY | Admitting: ORTHOPAEDIC SURGERY

## 2021-09-08 ENCOUNTER — ANESTHESIA (OUTPATIENT)
Dept: SURGERY | Age: 36
End: 2021-09-08

## 2021-09-08 VITALS
WEIGHT: 245 LBS | HEART RATE: 72 BPM | BODY MASS INDEX: 36.71 KG/M2 | RESPIRATION RATE: 16 BRPM | DIASTOLIC BLOOD PRESSURE: 72 MMHG | SYSTOLIC BLOOD PRESSURE: 115 MMHG | OXYGEN SATURATION: 92 % | TEMPERATURE: 97.9 F

## 2021-09-08 LAB
GLUCOSE BLD STRIP.AUTO-MCNC: 117 MG/DL (ref 65–100)
SERVICE CMNT-IMP: ABNORMAL

## 2021-09-08 PROCEDURE — 77030033138 HC SUT PGA STRATFX J&J -B: Performed by: ORTHOPAEDIC SURGERY

## 2021-09-08 PROCEDURE — 76010000161 HC OR TIME 1 TO 1.5 HR INTENSV-TIER 1: Performed by: ORTHOPAEDIC SURGERY

## 2021-09-08 PROCEDURE — 77030002966 HC SUT PDS J&J -A: Performed by: ORTHOPAEDIC SURGERY

## 2021-09-08 PROCEDURE — 76210000006 HC OR PH I REC 0.5 TO 1 HR: Performed by: ORTHOPAEDIC SURGERY

## 2021-09-08 PROCEDURE — 77030040922 HC BLNKT HYPOTHRM STRY -A: Performed by: ANESTHESIOLOGY

## 2021-09-08 PROCEDURE — 77030034204 HC BUR ARTHROSCP BRL FLUT STRY -B: Performed by: ORTHOPAEDIC SURGERY

## 2021-09-08 PROCEDURE — 74011250636 HC RX REV CODE- 250/636: Performed by: ORTHOPAEDIC SURGERY

## 2021-09-08 PROCEDURE — 77030006891 HC BLD SHV RESECT STRY -B: Performed by: ORTHOPAEDIC SURGERY

## 2021-09-08 PROCEDURE — 76942 ECHO GUIDE FOR BIOPSY: CPT | Performed by: ORTHOPAEDIC SURGERY

## 2021-09-08 PROCEDURE — 74011250636 HC RX REV CODE- 250/636: Performed by: NURSE ANESTHETIST, CERTIFIED REGISTERED

## 2021-09-08 PROCEDURE — 77030037088 HC TUBE ENDOTRACH ORAL NSL COVD-A: Performed by: ANESTHESIOLOGY

## 2021-09-08 PROCEDURE — 74011250636 HC RX REV CODE- 250/636: Performed by: ANESTHESIOLOGY

## 2021-09-08 PROCEDURE — 76060000034 HC ANESTHESIA 1.5 TO 2 HR: Performed by: ORTHOPAEDIC SURGERY

## 2021-09-08 PROCEDURE — 77030039425 HC BLD LARYNG TRULITE DISP TELE -A: Performed by: ANESTHESIOLOGY

## 2021-09-08 PROCEDURE — 77030003666 HC NDL SPINAL BD -A: Performed by: ORTHOPAEDIC SURGERY

## 2021-09-08 PROCEDURE — 77030002916 HC SUT ETHLN J&J -A: Performed by: ORTHOPAEDIC SURGERY

## 2021-09-08 PROCEDURE — 77030003602 HC NDL NRV BLK BBMI -B: Performed by: ANESTHESIOLOGY

## 2021-09-08 PROCEDURE — 77030027384 HC PRB ARTHSCP SERFAS STRY -C: Performed by: ORTHOPAEDIC SURGERY

## 2021-09-08 PROCEDURE — 74011000250 HC RX REV CODE- 250: Performed by: ANESTHESIOLOGY

## 2021-09-08 PROCEDURE — 76010010054 HC POST OP PAIN BLOCK: Performed by: ORTHOPAEDIC SURGERY

## 2021-09-08 PROCEDURE — 82962 GLUCOSE BLOOD TEST: CPT

## 2021-09-08 PROCEDURE — 76210000020 HC REC RM PH II FIRST 0.5 HR: Performed by: ORTHOPAEDIC SURGERY

## 2021-09-08 PROCEDURE — 77030010507 HC ADH SKN DERMBND J&J -B: Performed by: ORTHOPAEDIC SURGERY

## 2021-09-08 PROCEDURE — 77030003690 HC NDL TAPR ASPN -A: Performed by: ORTHOPAEDIC SURGERY

## 2021-09-08 PROCEDURE — 77030004453 HC BUR SHV STRY -B: Performed by: ORTHOPAEDIC SURGERY

## 2021-09-08 PROCEDURE — 77030002933 HC SUT MCRYL J&J -A: Performed by: ORTHOPAEDIC SURGERY

## 2021-09-08 PROCEDURE — 2709999900 HC NON-CHARGEABLE SUPPLY: Performed by: ORTHOPAEDIC SURGERY

## 2021-09-08 PROCEDURE — 77030018673: Performed by: ORTHOPAEDIC SURGERY

## 2021-09-08 PROCEDURE — 77030040361 HC SLV COMPR DVT MDII -B: Performed by: ORTHOPAEDIC SURGERY

## 2021-09-08 PROCEDURE — 77030019908 HC STETH ESOPH SIMS -A: Performed by: ANESTHESIOLOGY

## 2021-09-08 PROCEDURE — 74011000250 HC RX REV CODE- 250: Performed by: NURSE ANESTHETIST, CERTIFIED REGISTERED

## 2021-09-08 PROCEDURE — C1713 ANCHOR/SCREW BN/BN,TIS/BN: HCPCS | Performed by: ORTHOPAEDIC SURGERY

## 2021-09-08 PROCEDURE — 77030033005 HC TBNG ARTHSC PMP STRY -B: Performed by: ORTHOPAEDIC SURGERY

## 2021-09-08 PROCEDURE — 74011250636 HC RX REV CODE- 250/636: Performed by: PHYSICIAN ASSISTANT

## 2021-09-08 PROCEDURE — 23430 REPAIR BICEPS TENDON: CPT | Performed by: ORTHOPAEDIC SURGERY

## 2021-09-08 PROCEDURE — 77030034139 HC NDL ENDOSC TRUPASS SGL S&N -C: Performed by: ORTHOPAEDIC SURGERY

## 2021-09-08 PROCEDURE — 74011000250 HC RX REV CODE- 250: Performed by: ORTHOPAEDIC SURGERY

## 2021-09-08 PROCEDURE — 74011250637 HC RX REV CODE- 250/637: Performed by: ANESTHESIOLOGY

## 2021-09-08 DEVICE — ICONIX 2 NEEDLES WITH INTELLIBRAID TECHNOLOGY, 2.3MM ANCHOR WITH 2 STRANDS #2 FORCE FIBER
Type: IMPLANTABLE DEVICE | Site: SHOULDER | Status: FUNCTIONAL
Brand: ICONIX

## 2021-09-08 RX ORDER — SODIUM CHLORIDE, SODIUM LACTATE, POTASSIUM CHLORIDE, CALCIUM CHLORIDE 600; 310; 30; 20 MG/100ML; MG/100ML; MG/100ML; MG/100ML
100 INJECTION, SOLUTION INTRAVENOUS CONTINUOUS
Status: DISCONTINUED | OUTPATIENT
Start: 2021-09-08 | End: 2021-09-08 | Stop reason: HOSPADM

## 2021-09-08 RX ORDER — CEFAZOLIN SODIUM/WATER 2 G/20 ML
2 SYRINGE (ML) INTRAVENOUS ONCE
Status: COMPLETED | OUTPATIENT
Start: 2021-09-08 | End: 2021-09-08

## 2021-09-08 RX ORDER — SODIUM CHLORIDE 0.9 % (FLUSH) 0.9 %
5-40 SYRINGE (ML) INJECTION EVERY 8 HOURS
Status: DISCONTINUED | OUTPATIENT
Start: 2021-09-08 | End: 2021-09-08 | Stop reason: HOSPADM

## 2021-09-08 RX ORDER — HYDROMORPHONE HYDROCHLORIDE 2 MG/ML
0.5 INJECTION, SOLUTION INTRAMUSCULAR; INTRAVENOUS; SUBCUTANEOUS
Status: DISCONTINUED | OUTPATIENT
Start: 2021-09-08 | End: 2021-09-08 | Stop reason: HOSPADM

## 2021-09-08 RX ORDER — PROPOFOL 10 MG/ML
INJECTION, EMULSION INTRAVENOUS AS NEEDED
Status: DISCONTINUED | OUTPATIENT
Start: 2021-09-08 | End: 2021-09-08 | Stop reason: HOSPADM

## 2021-09-08 RX ORDER — NALOXONE HYDROCHLORIDE 0.4 MG/ML
0.1 INJECTION, SOLUTION INTRAMUSCULAR; INTRAVENOUS; SUBCUTANEOUS AS NEEDED
Status: DISCONTINUED | OUTPATIENT
Start: 2021-09-08 | End: 2021-09-08 | Stop reason: HOSPADM

## 2021-09-08 RX ORDER — ROPIVACAINE HYDROCHLORIDE 5 MG/ML
INJECTION, SOLUTION EPIDURAL; INFILTRATION; PERINEURAL
Status: COMPLETED | OUTPATIENT
Start: 2021-09-08 | End: 2021-09-08

## 2021-09-08 RX ORDER — TRANEXAMIC ACID 100 MG/ML
INJECTION, SOLUTION INTRAVENOUS AS NEEDED
Status: DISCONTINUED | OUTPATIENT
Start: 2021-09-08 | End: 2021-09-08 | Stop reason: HOSPADM

## 2021-09-08 RX ORDER — SODIUM CHLORIDE 0.9 % (FLUSH) 0.9 %
5-40 SYRINGE (ML) INJECTION AS NEEDED
Status: DISCONTINUED | OUTPATIENT
Start: 2021-09-08 | End: 2021-09-08 | Stop reason: HOSPADM

## 2021-09-08 RX ORDER — OXYCODONE HYDROCHLORIDE 5 MG/1
5 TABLET ORAL
Status: DISCONTINUED | OUTPATIENT
Start: 2021-09-08 | End: 2021-09-08 | Stop reason: HOSPADM

## 2021-09-08 RX ORDER — NEOSTIGMINE METHYLSULFATE 1 MG/ML
INJECTION, SOLUTION INTRAVENOUS AS NEEDED
Status: DISCONTINUED | OUTPATIENT
Start: 2021-09-08 | End: 2021-09-08 | Stop reason: HOSPADM

## 2021-09-08 RX ORDER — LIDOCAINE HYDROCHLORIDE 10 MG/ML
0.1 INJECTION INFILTRATION; PERINEURAL AS NEEDED
Status: DISCONTINUED | OUTPATIENT
Start: 2021-09-08 | End: 2021-09-08 | Stop reason: HOSPADM

## 2021-09-08 RX ORDER — LIDOCAINE HYDROCHLORIDE 20 MG/ML
INJECTION, SOLUTION EPIDURAL; INFILTRATION; INTRACAUDAL; PERINEURAL AS NEEDED
Status: DISCONTINUED | OUTPATIENT
Start: 2021-09-08 | End: 2021-09-08 | Stop reason: HOSPADM

## 2021-09-08 RX ORDER — HYDROCODONE BITARTRATE AND ACETAMINOPHEN 7.5; 325 MG/1; MG/1
1 TABLET ORAL AS NEEDED
Status: DISCONTINUED | OUTPATIENT
Start: 2021-09-08 | End: 2021-09-08 | Stop reason: HOSPADM

## 2021-09-08 RX ORDER — FAMOTIDINE 20 MG/1
20 TABLET, FILM COATED ORAL ONCE
Status: COMPLETED | OUTPATIENT
Start: 2021-09-08 | End: 2021-09-08

## 2021-09-08 RX ORDER — EPINEPHRINE 1 MG/ML
INJECTION, SOLUTION, CONCENTRATE INTRAVENOUS AS NEEDED
Status: DISCONTINUED | OUTPATIENT
Start: 2021-09-08 | End: 2021-09-08 | Stop reason: HOSPADM

## 2021-09-08 RX ORDER — LIDOCAINE HYDROCHLORIDE AND EPINEPHRINE 10; 10 MG/ML; UG/ML
INJECTION, SOLUTION INFILTRATION; PERINEURAL AS NEEDED
Status: DISCONTINUED | OUTPATIENT
Start: 2021-09-08 | End: 2021-09-08 | Stop reason: HOSPADM

## 2021-09-08 RX ORDER — ONDANSETRON 2 MG/ML
INJECTION INTRAMUSCULAR; INTRAVENOUS AS NEEDED
Status: DISCONTINUED | OUTPATIENT
Start: 2021-09-08 | End: 2021-09-08 | Stop reason: HOSPADM

## 2021-09-08 RX ORDER — GLYCOPYRROLATE 0.2 MG/ML
INJECTION INTRAMUSCULAR; INTRAVENOUS AS NEEDED
Status: DISCONTINUED | OUTPATIENT
Start: 2021-09-08 | End: 2021-09-08 | Stop reason: HOSPADM

## 2021-09-08 RX ORDER — MIDAZOLAM HYDROCHLORIDE 1 MG/ML
2 INJECTION, SOLUTION INTRAMUSCULAR; INTRAVENOUS
Status: COMPLETED | OUTPATIENT
Start: 2021-09-08 | End: 2021-09-08

## 2021-09-08 RX ORDER — SODIUM CHLORIDE 9 MG/ML
25 INJECTION, SOLUTION INTRAVENOUS CONTINUOUS
Status: DISCONTINUED | OUTPATIENT
Start: 2021-09-08 | End: 2021-09-08 | Stop reason: HOSPADM

## 2021-09-08 RX ORDER — ROCURONIUM BROMIDE 10 MG/ML
INJECTION, SOLUTION INTRAVENOUS AS NEEDED
Status: DISCONTINUED | OUTPATIENT
Start: 2021-09-08 | End: 2021-09-08 | Stop reason: HOSPADM

## 2021-09-08 RX ORDER — FENTANYL CITRATE 50 UG/ML
100 INJECTION, SOLUTION INTRAMUSCULAR; INTRAVENOUS ONCE
Status: COMPLETED | OUTPATIENT
Start: 2021-09-08 | End: 2021-09-08

## 2021-09-08 RX ORDER — DEXAMETHASONE SODIUM PHOSPHATE 4 MG/ML
INJECTION, SOLUTION INTRA-ARTICULAR; INTRALESIONAL; INTRAMUSCULAR; INTRAVENOUS; SOFT TISSUE AS NEEDED
Status: DISCONTINUED | OUTPATIENT
Start: 2021-09-08 | End: 2021-09-08 | Stop reason: HOSPADM

## 2021-09-08 RX ORDER — EPHEDRINE SULFATE/0.9% NACL/PF 50 MG/5 ML
SYRINGE (ML) INTRAVENOUS AS NEEDED
Status: DISCONTINUED | OUTPATIENT
Start: 2021-09-08 | End: 2021-09-08 | Stop reason: HOSPADM

## 2021-09-08 RX ADMIN — PROPOFOL 300 MG: 10 INJECTION, EMULSION INTRAVENOUS at 11:32

## 2021-09-08 RX ADMIN — FENTANYL CITRATE 100 MCG: 50 INJECTION, SOLUTION INTRAMUSCULAR; INTRAVENOUS at 08:48

## 2021-09-08 RX ADMIN — MIDAZOLAM 2 MG: 1 INJECTION INTRAMUSCULAR; INTRAVENOUS at 11:16

## 2021-09-08 RX ADMIN — ONDANSETRON 4 MG: 2 INJECTION INTRAMUSCULAR; INTRAVENOUS at 12:36

## 2021-09-08 RX ADMIN — ROPIVACAINE HYDROCHLORIDE 30 ML: 5 INJECTION, SOLUTION EPIDURAL; INFILTRATION; PERINEURAL at 08:51

## 2021-09-08 RX ADMIN — FAMOTIDINE 20 MG: 20 TABLET, FILM COATED ORAL at 08:23

## 2021-09-08 RX ADMIN — GLYCOPYRROLATE 0.4 MG: 0.2 INJECTION, SOLUTION INTRAMUSCULAR; INTRAVENOUS at 12:36

## 2021-09-08 RX ADMIN — TRANEXAMIC ACID 1 G: 100 INJECTION, SOLUTION INTRAVENOUS at 11:47

## 2021-09-08 RX ADMIN — DEXAMETHASONE SODIUM PHOSPHATE 4 MG: 4 INJECTION, SOLUTION INTRAMUSCULAR; INTRAVENOUS at 12:36

## 2021-09-08 RX ADMIN — LIDOCAINE HYDROCHLORIDE 80 MG: 20 INJECTION, SOLUTION EPIDURAL; INFILTRATION; INTRACAUDAL; PERINEURAL at 11:32

## 2021-09-08 RX ADMIN — CEFAZOLIN 2 G: 1 INJECTION, POWDER, FOR SOLUTION INTRAVENOUS at 11:47

## 2021-09-08 RX ADMIN — Medication 3 MG: at 12:36

## 2021-09-08 RX ADMIN — MIDAZOLAM 2 MG: 1 INJECTION INTRAMUSCULAR; INTRAVENOUS at 08:48

## 2021-09-08 RX ADMIN — ROCURONIUM BROMIDE 50 MG: 10 INJECTION, SOLUTION INTRAVENOUS at 11:32

## 2021-09-08 RX ADMIN — SODIUM CHLORIDE, SODIUM LACTATE, POTASSIUM CHLORIDE, AND CALCIUM CHLORIDE 100 ML/HR: 600; 310; 30; 20 INJECTION, SOLUTION INTRAVENOUS at 08:28

## 2021-09-08 RX ADMIN — Medication 20 MG: at 12:20

## 2021-09-08 NOTE — ANESTHESIA PREPROCEDURE EVALUATION
Relevant Problems   No relevant active problems       Anesthetic History               Review of Systems / Medical History  Patient summary reviewed and pertinent labs reviewed    Pulmonary          Smoker (Former)         Neuro/Psych         Psychiatric history (ADD, OCD)     Cardiovascular                  Exercise tolerance: >4 METS     GI/Hepatic/Renal                Endo/Other    Diabetes         Other Findings   Comments: Hx Controlled substance use  ETOH abuse         Physical Exam    Airway  Mallampati: II  TM Distance: > 6 cm  Neck ROM: normal range of motion   Mouth opening: Normal     Cardiovascular  Regular rate and rhythm,  S1 and S2 normal,  no murmur, click, rub, or gallop             Dental  No notable dental hx       Pulmonary  Breath sounds clear to auscultation               Abdominal         Other Findings            Anesthetic Plan    ASA: 2  Anesthesia type: general      Post-op pain plan if not by surgeon: peripheral nerve block single      Anesthetic plan and risks discussed with: Patient

## 2021-09-08 NOTE — OP NOTES
Operative Note    9/8/2021     Preoperative diagnosis:  Traumatic incomplete tear of left rotator cuff, initial encounter [S46.012A]  Bicipital tendinopathy    Postoperative diagnosis: Left biceps tendonitis, instability of biceps tendon., Superior labral tear    Surgeon(s) and Role:     Ave Woods MD - Primary     Assistant:      Anesthesia: General,  regional block    Antibiotics: Ancef 2 grams IV    Procedures:  Procedure(s):  LEFT SHOULDER ARTHROSCOPY DEBRIDEMENT  LEFT OPEN BICEPS TENODESIS   Subpectoral biceps tenodesis 32278    Findings:  1. EUA -   Normal full ROM. 2. Intra-articular -the articular side of the rotator cuff appeared to be intact throughout. The supraspinatus did not show any signs of significant fraying or evidence of high-grade tearing. Subscapularis did not show any significant tearing or retraction. There was some tearing of the coracohumeral ligament. The biceps itself appeared unstable over the superior bicipital groove. There was also some instability and tearing of the superior labrum. The rest of the cartilage on the humeral head and glenoid appeared normal.    3. Subacromial - RTC appeared intact throughout. The bursa was fairly thickened and inflamed. No evidence of bursal sided tearing. No significant CA ligament fraying or subacromial spurring. 4. AC joint - Not symptomatic pre-operatively. Indications / Consent: This is a patient who has persistent pain with weakness in the shoulder. They have not responded to conservative measures and were  were desirous of evaluation and possible arthroscopic repair of the rotator cuff as well as biceps evaluation. After previous discussions and treatments using both conservative and/or non-operative treatment options the patient elected to proceed with surgery due to continued symptoms.   A review of the risks and benefits, including but not limited to infection, stiffness, injury to nerves and vessels, DVT, PE, MI, need for further operations and other anesthesia related risks was performed with the patient. After this review and the review of the likely outcome and potential complications of the procedure, preoperative verbal and written consents were obtained. The operative procedure and postoperative course were discussed with the patient in detail and the extremity was marked by the patient and myself. Procedure: The patient was given an anesthetic, placed semi sitting, the head was held in a neutral position, the legs were flexed and pillows were placed under the legs. An EUA was performed and noted above. They were then padded and the operative shoulder was prepped with Etoh and ChloraPrep, then draped in the usual fashion. Prior to the beginning of the procedure, a time-out was performed for correct surgical site identification as was marked during the pre-operative meeting. This was confirmed using the written consent and history/physical. Time-out for antibiotic dosing, timing and selection was also performed. The operative arm was connected to an arm cheung and 10 cc's of 1% lidocaine with epinephrine was injected into the subacromial space. A standard posterior portal was made into the shoulder. On visualization of the shoulder after an anterior portal was developed, the biceps appeared abnormal secondary to the instability of the superior groove as well as its attachment site of the superior labrum. The subscapularis appeared intact. The anterior labrum was intact. The axillary recess was normal.  The posterior labrum was intact. The articular surface appeared normal.  The scope was switched for a superior view and the rotator cuff appeared intact. The rest of the rotator cuff appeared normal. At this point the scope was pulled out of the posterior portal and placed subacromially. A lateral portal was developed.    The bursa was very thickened and after complete bursectomy was performed the rotator cuff was inspected and no signs of bursal sided tearing was noted. There was no fraying on the coracoacromial ligament and undersurface of the acromion. The scope was then removed from the shoulder. At this point a PDS suture was placed into the intra-articular biceps tendon. The biter was used to perform a tenotomy. The scope was then removed from the shoulder. The cannula was removed. Attention was then turned to the subpectoral approach for the tenodesis. A small 3-4 cm incision near the axilla was performed with a scalpel. Using blunt and sharp dissection the bicipital groove and inferior border of the pectoralis major was identified. Carefully, retractors were placed laterally and medially to protect the neurovascular structures. The bicipital sheath was opened and the proximal biceps removed from the groove. A drill was then used to make a hole for the anchor device in the biceps groove proximal to the inferior aspect of the pectoralis tendon. The cortex was roughened with a curet and then the anchor was deployed into the humerus. The biceps tendon was then stitched about 2 cm proximal to the musculotendinous junction with the sutures in a circumferential fashion to apply appropriate tension to the biceps. Irrigation was performed. The tendon was then secured to the cortex with the stitches and the device. The elbow was taken through full extension and flexion and the fixation was secure. Irrigation was repeated. The incision was then closed in layered fashion and skin glue was applied. Interrupted monofilament sutures were placed in the portals. A sterile dressing was placed on the shoulder. The patient was placed in a sling. They were returned to the recovery room in satisfactory condition. There were no known intraoperative complications. All counts were correct. Post-operative plan: The patient was placed in an abduction sling and will remain in this for 4-6 weeks.  Non weight bearing with their operative arm until otherwise instructed. Post operative instructions are provided. They will begin with instructed ROM exercises and PT once scheduled through my office. I will see them back in approximately 10 days. Estimated Blood Loss:  Minimal    Fluids:   See anesthesia notes. Implant:   Implant Name Type Inv.  Item Serial No.  Lot No. LRB No. Used Action   ANCHOR SUT DIA2.3MM W/ NDL 2 STRND NO2 FOR FBR - [id number]  ANCHOR SUT DIA2.3MM W/ NDL 2 STRND NO2 FOR FBR  [employer]WD [id number] Left 1 Implanted       Closure: Primary    Complications: None    Signed By: Vicente Frazier MD

## 2021-09-08 NOTE — BRIEF OP NOTE
Brief Postoperative Note    Patient: Amanda Giron  YOB: 1985  MRN: [id number]    Date of Procedure: 9/8/2021     Pre-Op Diagnosis: Traumatic incomplete tear of left rotator cuff, initial encounter [S46.012A]  Bicipital tendinitis, instability    Post-Op Diagnosis: Same as preoperative diagnosis. Procedure(s):  LEFT SHOULDER ARTHROSCOPY DEBRIDEMENT  LEFT OPEN BICEPS TENODESIS    Surgeon(s):  Sallie Lee MD    Surgical Assistant: Surg Asst-1: Ana Luisa Saba    Anesthesia: General     Estimated Blood Loss (mL): Minimal    Complications: None    Specimens: * No specimens in log *     Implants:   Implant Name Type Inv.  Item Serial No.  Lot No. LRB No. Used Action   ANCHOR SUT DIA2.3MM W/ NDL 2 STRND NO2 FOR FBR - [id number]  ANCHOR SUT DIA2.3MM W/ NDL 2 STRND NO2 FORC FBR  [employer] [id number] Left 1 Implanted       Drains: * No LDAs found *    Findings: See op note    Electronically Signed by José Miguel Call MD on 9/8/2021 at 12:35 PM

## 2021-09-08 NOTE — DISCHARGE INSTRUCTIONS
Postoperative Instructions    Returning Home    Care of your incisions:    1) Incisions are checked 10-12 days after surgery at your 1st appointment after surgery. 2) Moderate bleeding may occur at the incision sites. This should decrease quickly over time. 3) Leave the dressings in place for 3-4 days. Then dressings may be removed and replaced with Band-Aids or fresh gauze. You may bath or shower after the dressings have been removed but the incisions must be kept clean and dry. Using [employer] or a garbage bag can be useful to avoid getting the incisions wet. You may take your arm out of the sling for showering or bathing but being careful to avoid use of the arm. You may let your arm also hang at your side during showering or at your side during bathing. 4) Watch the wound for increasing redness, tenderness, swelling and pus drainage daily. These can be early signs of infection. Please communicate any concerns. 5) A slight temperature the first few days after surgery is not uncommon. This can be treated with deep breathing and coughing to clear the lungs. However, fevers (anything over 101°F), increasing pain, and swelling at the incisions should be reported immediately. 6) Keep the wounds dry until your first follow-up visit. 7) It is fine to loosen the sling and do elbow straightening and bending with the arm at the side. You should begin by using your non-operative arm to help move your operative arm. Do not lift any weight with your arm for 6-8 weeks. It is also good to move the wrist and hand. This can be done as much as you desire, but at least three times a day. You are to wear sling at all times except when doing the exercises as above. 8) Deep vein thrombosis (DVT) is a condition in which a blood clot has formed in a deep vein of the leg or arm. This may result in redness, swelling, warmth and/or pain of the leg/arm.  Although these are very rare, there are things that can be done to lessen the risk. It is recommended by your surgeon unless indicated otherwise, after arthroscopy; take an adult aspirin once a day for three weeks after surgery to help prevent the formation of blood clots. (Do not take aspirin against the advice of your medical doctor). Pain Management:    Pain after the surgery will vary from patient to patient. A certain amount of discomfort is normal and should not be alarming. We do recommend starting your prescription pain medications once you begin to experience a moderate amount of pain. In the first 24-48 hours with your nerve block working you can expect a small amount of pain and can use pain medications as needed. If you have moderate to severe pain with questionable effectiveness of nerve block please take the pain medications that were prescribed to you. If no relief is obtained call the office number on this sheet. Many pain medications contain Tylenol. Do not take additional Tylenol while taking the prescribed pain medication. Pain medications can cause constipation, so keep hydrated and consider over the counter additions like Metamucil or stool softener. Pain medications and antibiotics that are given during the sami-operative time can cause itching and hives; over the counter Benadryl (25mg every 6 hours) can be helpful in treating this. If your pain is not adequately controlled, contact your physician at the numbers on this sheet. For the first week:  1) Icing for 15 minutes at a time may reduce your pain. Allow at least 30 minutes between ice applications. 2) Sleeping might be difficult. Some individuals find sleeping in a recliner or inclined with pillows or a foam wedge helpful. 3) Putting a mitten on the hand of the affected shoulder can be helpful since some individuals find that warm hands help decrease pain. During this time nausea and light-headedness are common and should improve in 2-5 days. Drinking fluids and eating may help. If nausea persists, medicine can be prescribed by calling your doctor on the number(s) listed. Follow-up visits  Doctor  Plan on seeing your surgeon 6 to 10 days after surgery. Physical Therapy   Physical therapy will be set up on an individualized basis depending on your surgery. ? You will want to line up a helper to assist you with your home therapy program for approximately the first 6 weeks. Optimally, this person can come to your first physical therapy visit to have the physical therapist show them how to do the home therapy. The home therapy exercises will need to be done daily for approximately 20 minutes. Your helper doesnt need any medical training; the therapist will show them what they need to know. If you call the office please have us paged instead of leaving a voice mail so that we can immediately take care of your needs. [address]                 Phone [phone]  Enrique Partida                 Fax [phone]                             [address]    ACTIVITY  · As tolerated and as directed by your doctor. · Bathe or shower as directed by your doctor. DIET  · Clear liquids until no nausea or vomiting; then light diet for the first day. · Advance to regular diet on second day, unless your doctor orders otherwise. · If nausea and vomiting continues, call your doctor. PAIN  · Take pain medication as directed by your doctor. · Call your doctor if pain is NOT relieved by medication. · DO NOT take aspirin of blood thinners unless directed by your doctor.      DRESSING CARE       CALL YOUR DOCTOR IF   · Excessive bleeding that does not stop after holding pressure over the area  · Temperature of 101 degrees F or above  · Excessive redness, swelling or bruising, and/ or green or yellow, smelly discharge from incision    AFTER ANESTHESIA   · For the first 24 hours: DO NOT Drive, Drink alcoholic beverages, or Make important decisions. · Be aware of dizziness following anesthesia and while taking pain medication. APPOINTMENT DATE/ TIME    YOUR DOCTOR'S PHONE NUMBER       DISCHARGE SUMMARY from Nurse    PATIENT INSTRUCTIONS:    After general anesthesia or intravenous sedation, for 24 hours or while taking prescription Narcotics:  · Limit your activities  · Do not drive and operate hazardous machinery  · Do not make important personal or business decisions  · Do  not drink alcoholic beverages  · If you have not urinated within 8 hours after discharge, please contact your surgeon on call. *  Please give a list of your current medications to your Primary Care Provider. *  Please update this list whenever your medications are discontinued, doses are      changed, or new medications (including over-the-counter products) are added. *  Please carry medication information at all times in case of emergency situations. These are general instructions for a healthy lifestyle:    No smoking/ No tobacco products/ Avoid exposure to second hand smoke    Surgeon General's Warning:  Quitting smoking now greatly reduces serious risk to your health. Obesity, smoking, and sedentary lifestyle greatly increases your risk for illness    A healthy diet, regular physical exercise & weight monitoring are important for maintaining a healthy lifestyle    You may be retaining fluid if you have a history of heart failure or if you experience any of the following symptoms:  Weight gain of 3 pounds or more overnight or 5 pounds in a week, increased swelling in our hands or feet or shortness of breath while lying flat in bed. Please call your doctor as soon as you notice any of these symptoms; do not wait until your next office visit.     Recognize signs and symptoms of STROKE:    F-face looks uneven    A-arms unable to move or move unevenly    S-speech slurred or non-existent    T-time-call 911 as soon as signs and symptoms begin-DO NOT go       Back to bed or wait to see if you get better-TIME IS BRAIN.

## 2021-09-08 NOTE — ANESTHESIA PROCEDURE NOTES
Peripheral Block    Start time: 9/8/2021 8:47 AM  End time: 9/8/2021 8:51 AM  Performed by: Mary Reyes MD  Authorized by: Mary Reyes MD       Pre-procedure: Indications: at surgeon's request and post-op pain management    Preanesthetic Checklist: patient identified, risks and benefits discussed, site marked, timeout performed, anesthesia consent given and patient being monitored    Timeout Time: 08:47 EDT          Block Type:   Block Type:   Interscalene  Laterality:  Left  Monitoring:  Continuous pulse ox, frequent vital sign checks, heart rate, oxygen and responsive to questions  Injection Technique:  Single shot  Procedures: ultrasound guided and nerve stimulator    Patient Position: supine  Prep: DuraPrep    Location:  Interscalene  Needle Type:  Stimuplex  Needle Gauge:  20 G  Needle Localization:  Nerve stimulator and ultrasound guidance  Motor Response comment:   Motor Response: minimal motor response >0.4 mA   Medication Injected:  Ropivacaine (PF) (NAROPIN)(0.5%) 5 mg/mL injection, 30 mL  Med Admin Time: 9/8/2021 8:51 AM    Assessment:  Number of attempts:  1  Injection Assessment:  Incremental injection every 5 mL, local visualized surrounding nerve on ultrasound, negative aspiration for blood, no paresthesia, no intravascular symptoms and ultrasound image on chart  Patient tolerance:  Patient tolerated the procedure well with no immediate complications  Epi 1:433S added

## 2021-09-08 NOTE — H&P
Outpatient Surgery History and Physical:  Elliot Greer was seen and examined. CHIEF COMPLAINT:   Left shoulder pain. PE:  Visit Vitals  /87 (BP 1 Location: Right upper arm, BP Patient Position: Supine)   Pulse 73   Temp 98.7 °F (37.1 °C)   Resp 18   Wt 245 lb (111.1 kg)   SpO2 97%   BMI 36.71 kg/m²     Heart:   Regular rhythm, regular pulses. Lungs:  Are clear, non-labored respirations. Past Medical History:    Patient Active Problem List    Diagnosis    Left shoulder pain    Alcohol abuse    Anxiety    Depressive disorder    Attention deficit hyperactivity disorder, predominantly inattentive type    Controlled substance agreement signed     Signed February 15, 2019      Closed nondisplaced fracture of head of right radius    Right hand pain    ADD (attention deficit disorder)       Surgical History:   Past Surgical History:   Procedure Laterality Date    HX ORTHOPAEDIC      right elbow-2016,right-forearm       Social History: Patient  reports that he has quit smoking. He has never used smokeless tobacco. He reports current alcohol use. He reports that he does not use drugs. Family History: No family history on file. Allergies: Reviewed per EMR  No Known Allergies    Medications:    No current facility-administered medications on file prior to encounter. Current Outpatient Medications on File Prior to Encounter   Medication Sig    atomoxetine (Strattera) 40 mg capsule Strattera 40 mg capsule   Take 1 capsule every day by oral route in the morning. (Patient not taking: Reported on 9/3/2021)    sertraline (Zoloft) 50 mg tablet Zoloft 50 mg tablet   Take 2 tablets every day by oral route at bedtime. (Patient not taking: Reported on 9/3/2021)    clonazePAM (KlonoPIN) 0.5 mg tablet Klonopin 0.5 mg tablet   Take 1 tablet 3 times a day by oral route.  (Patient not taking: Reported on 9/3/2021)    cyclobenzaprine (FLEXERIL) 5 mg tablet Take 1-2 tab(s) po qhs    amphetamine-dextroamphetamine XR (ADDERALL XR) 30 mg XR capsule Take 30 mg by mouth daily. (Patient not taking: Reported on 6/4/2021)    naproxen (NAPROSYN) 500 mg tablet Take 500 mg by mouth two (2) times daily as needed.  clonazePAM (KlonoPIN) 1 mg tablet TAKE 1/2 TO 1 TABLET BY MOUTH UP TO 2 TIMES A DAY AS NEEDED (Patient not taking: Reported on 6/4/2021)       The surgery is planned for the left shoulder. arthroscopy of the left shoulder to include debridement and open subpectoral biceps tenodesis possible rotator cuff repair versus allograft patch. .        History and physical has been reviewed. The patient has been examined. There have been no significant clinical changes since the completion of the originally dated History and Physical.  Patient identified by surgeon; surgical site was confirmed by patient and surgeon. The patient is here today for outpatient surgery. I have examined the patient, no changes are noted in the patient's medical status. Necessity for the procedure/care is still present and the history and physical above is current. See the office notes for the full long term history of the problem. Please see the recent office notes for the musculoskeletal examination. We have already discussed the clinical implications of both conservative and operative treatments. They would like to proceed with operative treatment. I talked with them extensively about the risks, benefits, reasonable expectations and expected recovery time including long term need for ambulatory assistance as well as possible complications including but not limited to bleeding, infection, need for hardware removal or exchange, neurovascular injury, stiffness, pain, dislocation, continued problems, DVT, PE, hardware failure, other fracture, need for further surgery, heterotopic ossification, MI and other anesthesia related risks, etc. They have exhausted all other options and wish to proceed.    The patient was counseled at length about the risks of jorje Covid-19 during their perioperative period and any recovery window from their procedure. The patient was made aware that jorje Covid-19  may worsen their prognosis for recovering from their procedure and lend to a higher morbidity and/or mortality risk. All material risks, benefits, and reasonable alternatives including postponing the procedure were discussed. The patient does  wish to proceed with the procedure at this time.       Signed By: Nick Wood MD     September 8, 2021 7:10 AM

## 2021-09-08 NOTE — ANESTHESIA POSTPROCEDURE EVALUATION
Procedure(s):  LEFT SHOULDER ARTHROSCOPY DEBRIDEMENT  LEFT OPEN BICEPS TENODESIS. general    Anesthesia Post Evaluation      Multimodal analgesia: multimodal analgesia used between 6 hours prior to anesthesia start to PACU discharge  Patient location during evaluation: PACU  Patient participation: complete - patient participated  Level of consciousness: awake and alert  Pain management: adequate  Airway patency: patent  Anesthetic complications: no  Cardiovascular status: acceptable  Respiratory status: acceptable  Hydration status: acceptable  Post anesthesia nausea and vomiting:  none  Final Post Anesthesia Temperature Assessment:  Normothermia (36.0-37.5 degrees C)      INITIAL Post-op Vital signs:   Vitals Value Taken Time   /77 09/08/21 1318   Temp 36.6 °C (97.9 °F) 09/08/21 1253   Pulse 69 09/08/21 1319   Resp 16 09/08/21 1258   SpO2 94 % 09/08/21 1319   Vitals shown include unvalidated device data.

## 2021-09-08 NOTE — PERIOP NOTES
Debrief completed yes    Correct procedure yes    Count completed and verified yes    Specimen collected and verified n/a    Wound classification yes    Other n/a

## 2021-09-09 NOTE — ADDENDUM NOTE
Addendum  created 09/09/21 0497 by Jeremiah Lopez MD    Clinical Note Signed, Diagnosis association updated, Intraprocedure Blocks edited, Order Reconciliation Section accessed

## 2021-09-09 NOTE — PROGRESS NOTES
Thanks. An interscalene block was actually performed. It has been corrected in [address] [facility].

## 2021-09-27 ENCOUNTER — HOSPITAL ENCOUNTER (OUTPATIENT)
Dept: PHYSICAL THERAPY | Age: 36
Discharge: HOME | End: 2021-09-27
Attending: ORTHOPAEDIC SURGERY

## 2021-09-27 DIAGNOSIS — M67.922 TENDINOPATHY OF LEFT BICEPS: ICD-10-CM

## 2021-09-27 DIAGNOSIS — Z09 SURGERY FOLLOW-UP: ICD-10-CM

## 2021-09-27 PROCEDURE — 97161 PT EVAL LOW COMPLEX 20 MIN: CPT

## 2021-09-27 PROCEDURE — 97110 THERAPEUTIC EXERCISES: CPT

## 2021-09-27 NOTE — THERAPY EVALUATION
Madhu Rodriguez  : 1985  Primary:   Secondary:  [facility] at . [address]  [address]. [place] [address], [address]  [phone]   [phone]       Visit Count:  1     OUTPATIENT PHYSICAL THERAPY:Initial Assessment 2021   Treatment Diagnosis: Pain in Left Shoulder (M25.512)  Stiffness of Left Shoulder not elsewhere specified (M25.612)  Impingement syndrome of Left Shoulder (M75.42)  Muscle Weakness, Generalized (M62.81)  Encounter for other orthopedic aftercare (Z47.89)    Precautions/Allergies:   Patient has no known allergies. 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 Elkin Devine presents to physical therapy with decreased strength, ROM, joint mobility, and functional capacity after left bicep tenodesis. Pt underwent conservative management, but failed. Per surgeon notes, \"the articular side of the rotator cuff appeared to be intact throughout. The supraspinatus did not show any signs of significant fraying or evidence of high-grade tearing. Subscapularis did not show any significant tearing or retraction. There was some tearing of the coracohumeral ligament. The biceps itself appeared unstable over the superior bicipital groove. There was also some instability and tearing of the superior labrum. The rest of the cartilage on the humeral head and glenoid appeared normal. Subacromial - RTC appeared intact throughout. \"    Patient will benefit from skilled physical therapy for manual therapeutic techniques (as appropriate), therapeutic exercises and activities, neuromuscular re-education, and comprehensive home exercises program to address current impairments and functional limitations.      PROBLEM LIST (Impacting functional limitations):  Decreased Strength  Decreased ADL/Functional Activities  Increased Pain  Decreased Activity Tolerance  Increased Fatigue  Decreased Flexibility/Joint Mobility  Edema/Girth  Decreased Knowledge of Precautions  Decreased [place] with Home Exercise Program INTERVENTIONS PLANNED: (Treatment may consist of any combination of the following)  Balance Exercise  Cold  Cryotherapy  Family Education  Heat  Home Exercise Program (HEP)  Manual Therapy  Neuromuscular Re-education/Strengthening  Range of Motion (ROM)  Therapeutic Activites  Therapeutic Exercise/Strengthening  Transcutaneous Electrical Nerve Stimulation (TENS)   TREATMENT PLAN:  Effective Dates: 9/27/2021 TO 12/26/2021 (90 days). Frequency/Duration: 2 times a week for 90 Day(s)    GOALS: (Goals have been discussed and agreed upon with patient.)    Goals: 45 days  Goal Met   1. Carmen Basurto will be independent with HEP to maintain functional gains made with therapy intervention. 1.  [] Date:   2. Carmen Basurto will demonstrate increased shoulder flexion  to 165 degrees without pain to promote active use for overhead reaching. 2.  [] Date:   3. Carmen Basurto will demonstrate increased shoulder external rotation to 70 degrees  At 90 dg abduction without pain to promote donning of seat belt. 3.  [] Date:   4. Carmen Basurto will report sleeping through the night and not waking due to shoulder pain. 4.  [] Date:   5. Carmen Basurto will demonstrate a 20 point increase on the DASH to indicate decreased perceived disability and improved participation in ADLs/IADLs. 5.  [] Date:   6. Carmen Basurto will demonstrate ability to carry 50lb in a box while walking >= 150ft in order to return to work. 6.  [] Date:   7. Carmen Basurto will demonstrate appropriate lifting technique from floor to waist level with 50 lbs and no cues from therapist to complete work related responsibilities 7. [] Date:   8. Gonzalo Osgood will demonstrate ability to lift 45 lbs overhead in order to promote ability to complete household chores. 8.  [] Date:   9. Gonzalo Osgood will demonstrate ability to pull 30 lbs in order to complete yardwork and household chores. 9.  [] Date:       OUTCOME MEASURE:   Tool Used: Disabilities of the Arm, Shoulder and Hand (DASH) Questionnaire - Quick Version  Score:  Initial: 40/55  Most Recent: X/55 (Date: -- )   Interpretation of Score: The DASH is designed to measure the activities of daily living in person's with upper extremity dysfunction or pain. Each section is scored on a 1-5 scale, 5 representing the greatest disability. The scores of each section are added together for a total score of 55. MEDICAL NECESSITY:   Patient is expected to demonstrate progress in strength, range of motion, functional technique and postural control/awareness  Skilled intervention continues to be required due to deficits and impairments seen upon initial evaluation affecting patient's participation in ADLs and functional tasks. REASON FOR SERVICES/OTHER COMMENTS:  Patient continues to require present interventions due to patient's inability to lift, carry, push, pull, lift overhead  Patient continues to require skilled intervention due to deficits and impairments seen upon initial evaluation affecting patient's participation in ADLs and functional tasks. Total Duration: 30 minutes plus treatment       Rehabilitation Potential For Stated Goals: Good  Regarding Tonja Zhu's therapy, I certify that the treatment plan above will be carried out by a therapist or under their direction.   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 Pt has to hold heavy objects for several minutes at a time. Pt tripped and fell at [facility]\"" which lead to injury of shoulder. Pt has a puppy and is having difficulty with playing with dog. Pt wants to work on losing weight. 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 Ana Maria Santiago  has a past medical history of ADD (attention deficit disorder), Diabetes ([facility] [address] OCD (obsessive compulsive disorder), and Psychiatric disorder. Mr. Ana Maria Santiago  has a past surgical history that includes hx orthopaedic. Ambulatory/Rehab Services H2 Model Falls Risk Assessment   Risk Factors:       (1)  Gender [Male] Ability to Rise from Chair:       (0)  Ability to rise in a single movement   Falls Prevention Plan:       No modifications necessary   Total: (5 or greater = High Risk): 1   ©2010 [facility] of [email or web address]. All Rights Reserved. [facility] Patent #[id number].  Federal Law prohibits the replication, distribution or use without written permission from [facility] [employer]   Current Medications:       Current Outpatient Medications:    cyclobenzaprine (FLEXERIL) 5 mg tablet, Take 1-2 tab(s) po qhs, Disp: 28 Tablet, Rfl: 0    traMADoL (ULTRAM) 50 mg tablet, Take 1-2 Tablets by mouth nightly as needed for Pain for up to 10 days. Max Daily Amount: 100 mg., Disp: 20 Tablet, Rfl: 0    ondansetron (ZOFRAN ODT) 4 mg disintegrating tablet, 1 Tablet by SubLINGual route every six (6) hours as needed for Nausea or Nausea or Vomiting. To start after surgery  Indications: prevent nausea and vomiting after surgery (Patient not taking: Reported on 9/8/2021), Disp: 20 Tablet, Rfl: 0    senna-docusate (PERICOLACE) 8.6-50 mg per tablet, Take 1 Tablet by mouth daily. To start after surgery  Indications: constipation, Disp: 21 Tablet, Rfl: 0    diazePAM (VALIUM) 10 mg tablet, Take 10 mg by mouth every six (6) hours as needed for Anxiety. , Disp: , Rfl:     atomoxetine (Strattera) 40 mg capsule, Strattera 40 mg capsule  Take 1 capsule every day by oral route in the morning. (Patient not taking: Reported on 9/3/2021), Disp: , Rfl:     sertraline (Zoloft) 50 mg tablet, Zoloft 50 mg tablet  Take 2 tablets every day by oral route at bedtime. (Patient not taking: Reported on 9/3/2021), Disp: , Rfl:     clonazePAM (KlonoPIN) 0.5 mg tablet, Klonopin 0.5 mg tablet  Take 1 tablet 3 times a day by oral route. (Patient not taking: Reported on 9/3/2021), Disp: , Rfl:     cyclobenzaprine (FLEXERIL) 5 mg tablet, Take 1-2 tab(s) po qhs, Disp: 28 Tablet, Rfl: 0    amphetamine-dextroamphetamine XR (ADDERALL XR) 30 mg XR capsule, Take 30 mg by mouth daily. (Patient not taking: Reported on 6/4/2021), Disp: , Rfl:     naproxen (NAPROSYN) 500 mg tablet, Take 500 mg by mouth two (2) times daily as needed. , Disp: , Rfl:     clonazePAM (KlonoPIN) 1 mg tablet, TAKE 1/2 TO 1 TABLET BY MOUTH UP TO 2 TIMES A DAY AS NEEDED (Patient not taking: Reported on 6/4/2021), Disp: , Rfl:    Date Last Reviewed:  9/27/2021     Number of Personal Factors/Comorbidities that affect the Plan of Care: 0:  LOW COMPLEXITY   EXAMINATION:   ROM:    AROM/PROM         Joint: Eval Date: 9/27/21  Re-Assess Date:  Re-Assess Date:    AAROM (standing) RIGHT LEFT RIGHT LEFT RIGHT LEFT   Shoulder Flexion 140 175       Shoulder Abduction 87 160       Shoulder External Rotation (Apley's) 60 dg WNL       Elbow ROM WNL WNL         Strength:    Joint: Eval Date: 9/27/21  Re-Assess Date:  Re-Assess Date:     RIGHT LEFT RIGHT LEFT RIGHT LEFT   Shoulder Flexion 3/5 4-/5       Shoulder Abduction  (C5) 3/5 4-/5       Shoulder Internal Rotation 3/5 4-/5       Shoulder External Rotation 3/5 4-/5       Elbow Flexion  (C6) 3/5 4+/5       Strength:  0-5 scale, 0 no muscle contraction; 1 no joint motion but contraction felt; 2 -less than full ROM in gravity eliminated; 2 full ROM in grav eliminated; 2+ full ROM in grav eliminated and sally to withstand minimal resist; 3-less than full ROM against gravity; 3 full ROM against gravity;  3+ full ROM against gravity and able to withstand minimal resist; 4- full ROM against gravity and able to withstand less than mod resist; 4 full ROM against gravity and able to withstand mod resist; 5 full ROM against gravity and able to withstand max resist.         Body Structures Involved:  Bones  Joints  Muscles  Ligaments Body Functions Affected:  Sensory/Pain  Movement Related Activities and Participation Affected:  General Tasks and Demands  Mobility  Self Care  Domestic Life  Interpersonal Interactions and Relationships  Community, Social and Civic Life   Number of elements (examined above) that affect the Plan of Care: 1-2: LOW COMPLEXITY   CLINICAL PRESENTATION:   Presentation: Stable and uncomplicated: LOW COMPLEXITY   CLINICAL DECISION MAKING:   Use of outcome tool(s) and clinical judgement create a POC that gives a: Clear prediction of patient's progress: LOW COMPLEXITY

## 2021-09-27 NOTE — PROGRESS NOTES
Madhu Rodriguez  : 1985  Payor: /  Joleen Gutierres at [address] [place] [address]. [address], [place], [address] [address]  [phone]   [phone]        OUTPATIENT PHYSICAL THERAPY: Daily Treatment Note 2021 Visit Count:  1    Treatment Diagnosis: Pain in Left Shoulder (M25.512)  Stiffness of Left Shoulder not elsewhere specified (M25.612)  Impingement syndrome of Left Shoulder (M75.42)  Muscle Weakness, Generalized (M62.81)  Encounter for other orthopedic aftercare (Z47.89)    Precautions/Allergies:   Patient has no known allergies. 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 Pain: Initial:7/10  Medications Last Reviewed:  2021     Post Session: 6/10   Updated Objective Findings: See Initial Eval for more details. TREATMENT:   THERAPEUTIC EXERCISE: (23 minutes):  Exercises per grid below to improve mobility, strength and balance. Required minimal visual, verbal and manual cues to promote proper body alignment and promote proper body posture. Progressed resistance and complexity of movement as indicated. Date:  2021 Date:   Date:     Activity/Exercise Parameters Parameters Parameters   Education HEP, POC, PT goals, anatomy/pathology, wear schedule with sling, walking, surgery and expectations after surgery     L Stretch 2 min     Table Abduction 2 min     Rings flexion  10x  Tricep extension  2 x 5 reps                             THERAPEUTIC ACTIVITY: ( 0 minutes): Activities per gid below to improve functional movement related mobility, strength and balance to improve neuro-muscular carryover to daily functional activities for improving patient's quality of life. Required visual, verbal and manual cues to promote proper body alignment and promote proper body posture/mechanics. Progressed resistance and complexity of movement as indicated. Date:  9/27/2021 Date:   Date:     Activity/Exercise Parameters Parameters Parameters                                                   MANUAL THERAPY: (0 minutes): Joint mobilization, Soft tissue mobilization was utilized and necessary because of the patient's restricted joint motion and restricted motion of soft tissue mobility. Date  9/27/2021    Technique Used Grade  Level # Time(s) Effect while being performed                                                                 HEP Log Date    9/27/2021   2.  9/27/2021   3. 9/27/2021   4.    5.           [employer] Portal  Treatment/Session Summary:    Response to Treatment: Pt demonstrated understanding of POC and initial HEP. No increase in pain or adverse reactions. Communication/Consultation:  POC, HEP, PT goals, Faxed initial evaluation to MD.   Equipment provided today: HEP Handout   Recommendations/Intent for next treatment session:   Next visit will focus on Core Stability Pain Science Education RTC strengthening soft tissue mobilization. Treatment Plan of Care Effective Dates: 9/27/2021 TO 12/26/2021 (90 days).   Frequency/Duration: 2 times a week for 90 Days       Total Treatment Billable Duration:   23  Rx plus Eval   PT Patient Time In/Time Out  Time In: 1600  Time Out: [address], PT    Future Appointments   Date Time Provider Simran Mckeon   10/6/2021  9:45 AM Delman Sizer, PT [facility]   10/11/2021  1:45 PM Delman Sizer, PT [facility]   10/13/2021  1:45 PM Delman Sizer, PT [facility]   10/14/2021  2:30 PM Keiry Allen MD [facility]   10/18/2021  1:45 PM Delman Sizer, PT [facility]   10/20/2021  1:45 PM Delman Sizer, PT [facility]   10/25/2021  2:30 PM Delman Sizer, PT [facility]   10/27/2021  2:30 PM Mello Cuba PT [facility]

## 2021-09-30 ENCOUNTER — HOSPITAL ENCOUNTER (OUTPATIENT)
Dept: PHYSICAL THERAPY | Age: 36
Discharge: HOME | End: 2021-09-30

## 2021-09-30 PROCEDURE — 97110 THERAPEUTIC EXERCISES: CPT

## 2021-09-30 NOTE — PROGRESS NOTES
Ninfa Up  : 1985  Payor: /  [id number] at [facility] 94. [address], [place], [address] [place] [address] Road  [phone]   [phone]        OUTPATIENT PHYSICAL THERAPY: Daily Treatment Note 2021 Visit Count:  2    Treatment Diagnosis: Pain in Left Shoulder (M25.512)  Stiffness of Left Shoulder not elsewhere specified (M25.612)  Impingement syndrome of Left Shoulder (M75.42)  Muscle Weakness, Generalized (M62.81)  Encounter for other orthopedic aftercare (Z47.89)    Precautions/Allergies:   Patient has no known allergies. 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 Pain: Initial:7/10  Medications Last Reviewed:  2021     Post Session: 6/10   Updated Objective Findings: 170 active assist motion in flexion        TREATMENT:   THERAPEUTIC EXERCISE: (30 minutes):  Exercises per grid below to improve mobility, strength and balance. Required minimal visual, verbal and manual cues to promote proper body alignment and promote proper body posture. Progressed resistance and complexity of movement as indicated. Date:  2021 Date:  2021 Date:     Activity/Exercise Parameters Parameters Parameters   Education HEP, POC, PT goals, anatomy/pathology, wear schedule with sling, walking, surgery and expectations after surgery Pt educated on procedure and the importance of protection and pain free motion. L Stretch 2 min 3 min    Table Abduction 2 min 15xs     Rings flexion  10x  Tricep extension  2 x 5 reps  15xs   Tricep extension  2 x 5 reps    pendulums  4 min                      THERAPEUTIC ACTIVITY: ( 0 minutes):  Activities per gid below to improve functional movement related mobility, strength and balance to improve neuro-muscular carryover to daily functional activities for improving patient's quality of life. Required visual, verbal and manual cues to promote proper body alignment and promote proper body posture/mechanics. Progressed resistance and complexity of movement as indicated. Date:  9/30/2021 Date:   Date:     Activity/Exercise Parameters Parameters Parameters                                                   MANUAL THERAPY: (0 minutes): Joint mobilization, Soft tissue mobilization was utilized and necessary because of the patient's restricted joint motion and restricted motion of soft tissue mobility. Date  9/30/2021    Technique Used Grade  Level # Time(s) Effect while being performed                                                                 HEP Log Date    9/30/2021   2.  9/30/2021   3. 9/30/2021   4.    5.           [employer] Portal  Treatment/Session Summary:    Response to Treatment: Continued motion to pain free. educated on no weight beaing and carrying in arm. Communication/Consultation:  POC, HEP, PT goals, Faxed initial evaluation to MD.   Equipment provided today: HEP Handout   Recommendations/Intent for next treatment session:   Next visit will focus on Core Stability Pain Science Education RTC strengthening soft tissue mobilization. Treatment Plan of Care Effective Dates: 9/27/2021 TO 12/26/2021 (90 days).   Frequency/Duration: 2 times a week for 90 Days       Total Treatment Billable Duration:   30  Rx   PT Patient Time In/Time Out  Time In: 5107  Time Out: [address] Vidya Dietrich PT    Future Appointments   Date Time Provider Simran Mckeon   10/6/2021  9:45 AM Rahat De La Rosa PT [facility]   10/11/2021  9:45 AM Rahat De La Rosa PT [facility]   10/13/2021  9:45 AM Rahat De La Rosa PT [facility]   10/14/2021  2:30 PM MD SHRUTI Park [facility]   10/18/2021  9:45 AM Rahat De La Rosa PT [facility]   10/20/2021  9:45 AM Valery Estrada, PT [facility]   10/25/2021  9:45 AM Valery Estrada PT [facility]   10/27/2021  9:45 AM [facility]ada [facility]

## 2021-10-06 ENCOUNTER — HOSPITAL ENCOUNTER (OUTPATIENT)
Dept: PHYSICAL THERAPY | Age: 36
Discharge: HOME | End: 2021-10-06
Attending: ORTHOPAEDIC SURGERY

## 2021-10-06 PROCEDURE — 97110 THERAPEUTIC EXERCISES: CPT

## 2021-10-06 NOTE — PROGRESS NOTES
Kevyn Hackett  : 1985  Payor: /  [id number] [facility] at [address] [place] [address]. [address] [place] [address] [address], [place], [id number] [place] [address] Road  [phone]   [phone]        OUTPATIENT PHYSICAL THERAPY: Daily Treatment Note 10/6/2021 Visit Count:  3    Treatment Diagnosis: Pain in Left Shoulder (M25.512)  Stiffness of Left Shoulder not elsewhere specified (M25.612)  Impingement syndrome of Left Shoulder (M75.42)  Muscle Weakness, Generalized (M62.81)  Encounter for other orthopedic aftercare (Z47.89)    Precautions/Allergies:   Patient has no known allergies. 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 Pain: Initial:7/10  Medications Last Reviewed:  10/6/2021     Post Session: 6/10   Updated Objective Findings: None today        TREATMENT:   THERAPEUTIC EXERCISE: (30 minutes):  Exercises per grid below to improve mobility, strength and balance. Required minimal visual, verbal and manual cues to promote proper body alignment and promote proper body posture. Progressed resistance and complexity of movement as indicated. Date:  2021 Date:  2021 Date:  10/6/21   Activity/Exercise Parameters Parameters Parameters   Education HEP, POC, PT goals, anatomy/pathology, wear schedule with sling, walking, surgery and expectations after surgery Pt educated on procedure and the importance of protection and pain free motion.     L Stretch 2 min 3 min 3 min   Table Abduction 2 min 15xs     Rings flexion  10x  Tricep extension  2 x 5 reps  15xs   Tricep extension  2 x 5 reps    pendulums  4 min    LTR   90 shoulder abductio  5 min   Open book   2 min  Left/right   Chest Press   5 x 5 reps  dowel   Shoulder ER   Dowel, supine yoga block  5 min   Wall slides   2 min  Progressed to lift off x 10 reps         THERAPEUTIC ACTIVITY: ( 0 minutes): Activities per gid below to improve functional movement related mobility, strength and balance to improve neuro-muscular carryover to daily functional activities for improving patient's quality of life. Required visual, verbal and manual cues to promote proper body alignment and promote proper body posture/mechanics. Progressed resistance and complexity of movement as indicated. Date:  10/6/2021 Date:   Date:     Activity/Exercise Parameters Parameters Parameters                                                   MANUAL THERAPY: (0 minutes): Joint mobilization, Soft tissue mobilization was utilized and necessary because of the patient's restricted joint motion and restricted motion of soft tissue mobility. Date  10/6/2021    Technique Used Grade  Level # Time(s) Effect while being performed                                                                 HEP Log Date   Open book, LTR, wall slide with lift off 10/6/2021   2.  10/6/2021   3. 10/6/2021   4.    5.           [employer] Portal  Treatment/Session Summary:    Response to Treatment: Pt responds well to self stretching with a drastic reduction in pain and tightness. Pt to gradual begin isometric strengthening of the RTC next visit. Communication/Consultation:  None today   Equipment provided today: HEP Handout   Recommendations/Intent for next treatment session:   Next visit will focus on Core Stability Pain Science Education RTC strengthening soft tissue mobilization. Treatment Plan of Care Effective Dates: 9/27/2021 TO 12/26/2021 (90 days).   Frequency/Duration: 2 times a week for 90 Days       Total Treatment Billable Duration:   30  Rx   PT Patient Time In/Time Out  Time In: 0945  Time Out: 1015  Tammie Jara PT    Future Appointments   Date Time Provider Simran Mckeon   10/11/2021  9:45 AM Lesvia Golds, PT [facility]   10/13/2021  9:45 AM Lesvia Golds, PT [facility]   10/14/2021  2:30 PM Triny Lux MD [facility]   10/18/2021  9:45 AM Lesvia Golds, PT [facility] [place] [facility]   10/20/2021  9:45 AM Lesvia Golds, PT [facility]   10/25/2021  9:45 AM Lesvia Golds, PT [facility]   10/27/2021  9:45 AM Lesvia Golds, PT [facility]

## 2021-10-11 ENCOUNTER — HOSPITAL ENCOUNTER (OUTPATIENT)
Dept: PHYSICAL THERAPY | Age: 36
Discharge: HOME | End: 2021-10-11
Attending: ORTHOPAEDIC SURGERY

## 2021-10-11 PROCEDURE — 97110 THERAPEUTIC EXERCISES: CPT

## 2021-10-11 NOTE — PROGRESS NOTES
Elisha Friday  : 1985  Payor: /  [id number] at [address] [facility]. [address], [place], [id number] [place] [address] Road  [phone]   [phone]        OUTPATIENT PHYSICAL THERAPY: Daily Treatment Note 10/11/2021 Visit Count:  4    Treatment Diagnosis: Pain in Left Shoulder (M25.512)  Stiffness of Left Shoulder not elsewhere specified (M25.612)  Impingement syndrome of Left Shoulder (M75.42)  Muscle Weakness, Generalized (M62.81)  Encounter for other orthopedic aftercare (Z47.89)    Precautions/Allergies:   Patient has no known allergies. 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 Pt reports he felt good after last treatment session and stretching helped relieve tightness. Pain: Initial:7/10  Medications Last Reviewed:  10/11/2021     Post Session: 6/10   Updated Objective Findings: None today        TREATMENT:   THERAPEUTIC EXERCISE: (40 minutes):  Exercises per grid below to improve mobility, strength and balance. Required minimal visual, verbal and manual cues to promote proper body alignment and promote proper body posture. Progressed resistance and complexity of movement as indicated. Date:  2021 Date:  2021 Date:  10/6/21 Date:  10/11/21   Activity/Exercise Parameters Parameters Parameters    Education HEP, POC, PT goals, anatomy/pathology, wear schedule with sling, walking, surgery and expectations after surgery Pt educated on procedure and the importance of protection and pain free motion.      L Stretch 2 min 3 min 3 min    Table Abduction 2 min 15xs      Rings flexion  10x  Tricep extension  2 x 5 reps  15xs   Tricep extension  2 x 5 reps  5 min stretch  OH Press  short bar 2 x 5 reps  5 lb  3 x 5 reps   pendulums  4 min     LTR   90 shoulder abductio  5 min    Open book   2 min  Left/right    Chest Press   5 x 5 reps  dowel    Shoulder ER   Dowel, supine yoga block  5 min    Wall slides   2 min  Progressed to lift off x 10 reps    Isometric Shoulder ER    Yellow  3 x 8 reps   Isometric Shoulder IR    Yellow  3 x 8 reps   Lat pull    27 lb, overhand  3 x 5 reps   Ws    Yellow  2 x 10 reps   UBE    3 min fwd/3 backward         THERAPEUTIC ACTIVITY: ( 0 minutes): Activities per gid below to improve functional movement related mobility, strength and balance to improve neuro-muscular carryover to daily functional activities for improving patient's quality of life. Required visual, verbal and manual cues to promote proper body alignment and promote proper body posture/mechanics. Progressed resistance and complexity of movement as indicated. Date:  10/11/2021 Date:   Date:     Activity/Exercise Parameters Parameters Parameters                                                   MANUAL THERAPY: (0 minutes): Joint mobilization, Soft tissue mobilization was utilized and necessary because of the patient's restricted joint motion and restricted motion of soft tissue mobility. Date  10/11/2021    Technique Used Grade  Level # Time(s) Effect while being performed                                                                 HEP Log Date   Open book, LTR, wall slide with lift off 10/6/2021   2. Isometric IR/ER, Ws 10/11/2021   3. 10/11/2021   4.    5.           [employer] Portal  Treatment/Session Summary:    Response to Treatment: Pt with normalized AAROM in B shoulder flexion with no end range pain. Pt initiated of isometric RTC strengthening and requires cues for thoracic extensor and scapular retraction control and has tendency to become pec dominant.  Pt to benefit from continued scapular stabilizer strengthening   Communication/Consultation:  None today   Equipment provided today: HEP Handout   Recommendations/Intent for next treatment session:   Next visit will focus on Core Stability Pain Science Education RTC strengthening soft tissue mobilization. Treatment Plan of Care Effective Dates: 9/27/2021 TO 12/26/2021 (90 days).   Frequency/Duration: 2 times a week for 90 Days       Total Treatment Billable Duration:   30  Rx      Lucian DeL a Rosa, REJI    Future Appointments   Date Time Provider Simran Mckeon   10/13/2021  9:45 AM Debra Grams, PT [facility]   10/14/2021  2:30 PM Raymond Hubbard MD [facility]   10/18/2021  9:45 AM Debra Grams, PT [facility]   10/20/2021  9:45 AM Debra Grams, PT [facility]   10/25/2021  9:45 AM Debra Grams, PT [facility]   10/27/2021  9:45 AM Debra Grams, PT [facility]

## 2021-10-13 ENCOUNTER — HOSPITAL ENCOUNTER (OUTPATIENT)
Dept: PHYSICAL THERAPY | Age: 36
Discharge: HOME | End: 2021-10-13
Attending: ORTHOPAEDIC SURGERY

## 2021-10-13 PROCEDURE — 97110 THERAPEUTIC EXERCISES: CPT

## 2021-10-13 NOTE — PROGRESS NOTES
Kevyn Hackett  : 1985  Payor: /  [id number] at 4 [facility]. [address], [place], [address] [place] [address] Road  [phone]   [phone]        OUTPATIENT PHYSICAL THERAPY: Daily Treatment Note 10/13/2021 Visit Count:  5    Treatment Diagnosis: Pain in Left Shoulder (M25.512)  Stiffness of Left Shoulder not elsewhere specified (M25.612)  Impingement syndrome of Left Shoulder (M75.42)  Muscle Weakness, Generalized (M62.81)  Encounter for other orthopedic aftercare (Z47.89)    Precautions/Allergies:   Patient has no known allergies. 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 Pain: Initial:7/10  Medications Last Reviewed:  10/13/2021     Post Session: 6/10   Updated Objective Findings: None today        TREATMENT:   THERAPEUTIC EXERCISE: ( 40 minutes):  Exercises per grid below to improve mobility, strength and balance. Required minimal visual, verbal and manual cues to promote proper body alignment and promote proper body posture. Progressed resistance and complexity of movement as indicated. Date:  2021 Date:  2021 Date:  10/6/21 Date:  10/11/21 Date:  10/13/21   Activity/Exercise Parameters Parameters Parameters     Education HEP, POC, PT goals, anatomy/pathology, wear schedule with sling, walking, surgery and expectations after surgery Pt educated on procedure and the importance of protection and pain free motion.       L Stretch 2 min 3 min 3 min     Table Abduction 2 min 15xs       Rings flexion  10x  Tricep extension  2 x 5 reps  15xs   Tricep extension  2 x 5 reps  5 min stretch  OH Press  short bar 2 x 5 reps  5 lb  3 x 5 reps 3 min  Additional of horizontal abd/add   pendulums  4 min      LTR   90 shoulder abductio  5 min     Open book   2 min  Left/right     Chest Press   5 x 5 reps  dowel     Shoulder ER   Dowel, supine yoga block  5 min     Wall slides   2 min  Progressed to lift off x 10 reps  2 min   Progressed to lift off 3 x 5 reps   Isometric Shoulder ER    Yellow  3 x 8 reps Red Band  3 x 8 reps   Isometric Shoulder IR    Yellow  3 x 8 reps Red Band  3 x 8 reps   Lat pull    27 lb, overhand  3 x 5 reps    Ws    Yellow  2 x 10 reps    UBE    3 min fwd/3 backward 3 min fwd/3 backward   DB OH Press     2 lb  3 x 5 reps   Rows     17 lb  3 x 8 reps         THERAPEUTIC ACTIVITY: ( 0 minutes): Activities per gid below to improve functional movement related mobility, strength and balance to improve neuro-muscular carryover to daily functional activities for improving patient's quality of life. Required visual, verbal and manual cues to promote proper body alignment and promote proper body posture/mechanics. Progressed resistance and complexity of movement as indicated. Date:  10/13/2021 Date:   Date:     Activity/Exercise Parameters Parameters Parameters                                                   MANUAL THERAPY: (0 minutes): Joint mobilization, Soft tissue mobilization was utilized and necessary because of the patient's restricted joint motion and restricted motion of soft tissue mobility. Date  10/13/2021    Technique Used Grade  Level # Time(s) Effect while being performed                                                                 HEP Log Date   Open book, LTR, wall slide with lift off 10/6/2021   2. Isometric IR/ER, Ws 10/11/2021   3. 10/13/2021   4.    5.           [facility] Portal  Treatment/Session Summary:    Response to Treatment: Pt to f/u with MD tomorrow. Pt to continue on scapular and postural strengthening exercises. Pt able to complete DB OH press with no increase in pain and symmetrical between UEs. Pt with mild instability noted at end range on left shoulder   Communication/Consultation:  None today   Equipment provided today: None today   Recommendations/Intent for next treatment session:   Next visit will focus on Core Stability Pain Science Education RTC strengthening soft tissue mobilization. Treatment Plan of Care Effective Dates: 9/27/2021 TO 12/26/2021 (90 days).   Frequency/Duration: 2 times a week for 90 Days       Total Treatment Billable Duration:   40  Rx , 5 min untimed  PT Patient Time In/Time Out  Time In: 0945  Time Out: [address] [facility],     Future Appointments   Date Time Provider Simran Mckeon   10/14/2021  2:30 PM Macy Samayoa MD [facility]   10/18/2021  9:45 AM Gennette Pump, PT [facility] [place] [facility]   10/20/2021  9:45 AM Gennette Pump, PT [facility]   10/25/2021  9:45 AM Gennette Pump, PT [facility]   10/27/2021  9:45 AM Genn[place] Pump, PT [facility]

## 2021-10-18 ENCOUNTER — HOSPITAL ENCOUNTER (OUTPATIENT)
Dept: PHYSICAL THERAPY | Age: 36
Discharge: HOME | End: 2021-10-18
Attending: ORTHOPAEDIC SURGERY

## 2021-10-18 PROCEDURE — 97110 THERAPEUTIC EXERCISES: CPT

## 2021-10-18 NOTE — PROGRESS NOTES
Wilfredo Esposito  : 1985  Payor: /  Kyra Fiore at [address] [place] [address]. [facility] [address], [facility], [address] [place] [address] Road  [phone]   [phone]        OUTPATIENT PHYSICAL THERAPY: Daily Treatment Note 10/18/2021 Visit Count:  6    Treatment Diagnosis: Pain in Left Shoulder (M25.512)  Stiffness of Left Shoulder not elsewhere specified (M25.612)  Impingement syndrome of Left Shoulder (M75.42)  Muscle Weakness, Generalized (M62.81)  Encounter for other orthopedic aftercare (Z47.89)    Precautions/Allergies:   Patient has no known allergies. 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 Pain: Initial:7/10  Medications Last Reviewed:  10/18/2021     Post Session: 6/10   Updated Objective Findings: None today        TREATMENT:   THERAPEUTIC EXERCISE: ( 38 minutes):  Exercises per grid below to improve mobility, strength and balance. Required minimal visual, verbal and manual cues to promote proper body alignment and promote proper body posture. Progressed resistance and complexity of movement as indicated. Date:  2021 Date:  2021 Date:  10/6/21 Date:  10/11/21 Date:  10/13/21 Date:  10/18/21   Activity/Exercise Parameters Parameters Parameters      Education HEP, POC, PT goals, anatomy/pathology, wear schedule with sling, walking, surgery and expectations after surgery Pt educated on procedure and the importance of protection and pain free motion.        L Stretch 2 min 3 min 3 min   3 min   Table Abduction 2 min 15xs        Rings flexion  10x  Tricep extension  2 x 5 reps  15xs   Tricep extension  2 x 5 reps  5 min stretch  OH Press  short bar 2 x 5 reps  5 lb  3 x 5 reps 3 min  Additional of horizontal abd/add    pendulums  4 min       LTR   90 shoulder abductio  5 min      Open book   2 min  Left/right      Chest Press   5 x 5 reps  dowel   5 lb  15x   Shoulder ER   Dowel, supine yoga block  5 min   S/L  3 lb  3 x 8 reps  Dowel, supine yoga block  5 min   Wall slides   2 min  Progressed to lift off x 10 reps  2 min   Progressed to lift off 3 x 5 reps    Isometric Shoulder ER    Yellow  3 x 8 reps Red Band  3 x 8 reps    Isometric Shoulder IR    Yellow  3 x 8 reps Red Band  3 x 8 reps    Lat pull    27 lb, overhand  3 x 5 reps  30 lb  3 x 8 reps   Ws    Yellow  2 x 10 reps     UBE    3 min fwd/3 backward 3 min fwd/3 backward 3 min fwd/3 backward  lvl 1.5   DB OH Press     2 lb  3 x 5 reps    Rows     17 lb  3 x 8 reps 20 lb  3 x 8 reps   Ms      Yellow  3 x 8 reps         THERAPEUTIC ACTIVITY: ( 0 minutes): Activities per gid below to improve functional movement related mobility, strength and balance to improve neuro-muscular carryover to daily functional activities for improving patient's quality of life. Required visual, verbal and manual cues to promote proper body alignment and promote proper body posture/mechanics. Progressed resistance and complexity of movement as indicated. Date:  10/18/2021 Date:   Date:     Activity/Exercise Parameters Parameters Parameters                                                   MANUAL THERAPY: (0 minutes): Joint mobilization, Soft tissue mobilization was utilized and necessary because of the patient's restricted joint motion and restricted motion of soft tissue mobility. Date  10/18/2021    Technique Used Grade  Level # Time(s) Effect while being performed                                                                 HEP Log Date   Open book, LTR, wall slide with lift off 10/6/2021   2. Isometric IR/ER, Ws 10/11/2021   3.  S/l ER 10/18/2021   4.    5.           [employer] Portal  Treatment/Session Summary:    Response to Treatment: Pt with normalized shoulder ER ROM and to continue with strengthening. Pt with increased challenge with S/L ER, however continue to build strength for overhead lifting required for work. Communication/Consultation:  None today   Equipment provided today: None today   Recommendations/Intent for next treatment session:   Next visit will focus on Core Stability Pain Science Education RTC strengthening soft tissue mobilization. Treatment Plan of Care Effective Dates: 9/27/2021 TO 12/26/2021 (90 days).   Frequency/Duration: 2 times a week for 90 Days       Total Treatment Billable Duration:   38  Rx , 7 min untimed  PT Patient Time In/Time Out  Time In: 0945  Time Out: 9[facility], PT    Future Appointments   Date Time Provider Simran Lockharti   10/20/2021  9:45 AM Ermias Weaver, PT [facility]   10/25/2021  9:45 AM Ermias Weaver, PT [facility]   10/27/2021  9:45 AM Ermias Weaver, PT [facility]

## 2021-10-20 ENCOUNTER — HOSPITAL ENCOUNTER (OUTPATIENT)
Dept: PHYSICAL THERAPY | Age: 36
Discharge: HOME | End: 2021-10-20
Attending: ORTHOPAEDIC SURGERY

## 2021-10-20 PROCEDURE — 97110 THERAPEUTIC EXERCISES: CPT

## 2021-10-20 NOTE — PROGRESS NOTES
Gonzalo Osgood  : 1985  Payor: /  Sonya Gomes at [address] [place] [address] [address], [place], [address]  [phone]   [phone]        OUTPATIENT PHYSICAL THERAPY: Daily Treatment Note 10/20/2021 Visit Count:  7    Treatment Diagnosis: Pain in Left Shoulder (M25.512)  Stiffness of Left Shoulder not elsewhere specified (M25.612)  Impingement syndrome of Left Shoulder (M75.42)  Muscle Weakness, Generalized (M62.81)  Encounter for other orthopedic aftercare (Z47.89)    Precautions/Allergies:   Patient has no known allergies. 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 Pain: Initial:7/10  Medications Last Reviewed:  10/20/2021     Post Session: 6/10   Updated Objective Findings: None today        TREATMENT:   THERAPEUTIC EXERCISE: ( 38 minutes):  Exercises per grid below to improve mobility, strength and balance. Required minimal visual, verbal and manual cues to promote proper body alignment and promote proper body posture. Progressed resistance and complexity of movement as indicated.      Date:  10/6/21 Date:  10/11/21 Date:  10/13/21 Date:  10/18/21 Date:  10/20/21   Activity/Exercise Parameters       Education        L Stretch 3 min   3 min    Rings  5 min stretch  OH Press  short bar 2 x 5 reps  5 lb  3 x 5 reps 3 min  Additional of horizontal abd/add     LTR 90 shoulder abductio  5 min       Open book 2 min  Left/right       Chest Press 5 x 5 reps  dowel   5 lb  15x    Shoulder ER Dowel, supine yoga block  5 min   S/L  3 lb  3 x 8 reps  Dowel, supine yoga block  5 min    Wall slides 2 min  Progressed to lift off x 10 reps  2 min   Progressed to lift off 3 x 5 reps     Isometric Shoulder ER  Yellow  3 x 8 reps Red Band  3 x 8 reps     Isometric Shoulder IR  Yellow  3 x 8 reps Red Band  3 x 8 reps     Lat pull  27 lb, overhand  3 x 5 reps  30 lb  3 x 8 reps 33 lb  3 x 8 reps   Ws  Yellow  2 x 10 reps      UBE  3 min fwd/3 backward 3 min fwd/3 backward 3 min fwd/3 backward  lvl 1.5 3 min fwd/3 backward  lvl 1.5   DB OH Press   2 lb  3 x 5 reps  4 lb  3 x 8 reps   Rows   17 lb  3 x 8 reps 20 lb  3 x 8 reps 27 lb  3 x 8 reps   Ms    Yellow  3 x 8 reps Yellow  3 x 8 reps   Serratus initiation     Red band  3 x 30 sec   Ys     Yellow  3 x 5 reps   Bent over Row     5 lb  3 x 8 reps   Kumar Carry     10 lb  4 x 100ft         THERAPEUTIC ACTIVITY: ( 0 minutes): Activities per gid below to improve functional movement related mobility, strength and balance to improve neuro-muscular carryover to daily functional activities for improving patient's quality of life. Required visual, verbal and manual cues to promote proper body alignment and promote proper body posture/mechanics. Progressed resistance and complexity of movement as indicated. Date:  10/20/2021 Date:   Date:     Activity/Exercise Parameters Parameters Parameters                                                   MANUAL THERAPY: (0 minutes): Joint mobilization, Soft tissue mobilization was utilized and necessary because of the patient's restricted joint motion and restricted motion of soft tissue mobility. Date  10/20/2021    Technique Used Grade  Level # Time(s) Effect while being performed                                                                 HEP Log Date   Open book, LTR, wall slide with lift off 10/6/2021   2. Isometric IR/ER, Ws 10/11/2021   3.  S/l ER 10/18/2021   4.    5.           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 Pt to be initiated on rack pulls next visit to begin lifting requirements for work   Communication/Consultation:  None today   Equipment provided today: None today   Recommendations/Intent for next treatment session:   Next visit will focus on Core Stability Pain Science Education RTC strengthening soft tissue mobilization. Treatment Plan of Care Effective Dates: 9/27/2021 TO 12/26/2021 (90 days).   Frequency/Duration: 2 times a week for 90 Days       Total Treatment Billable Duration:   38  Rx , 7 min untimed  PT Patient Time In/Time Out  Time In: 0950  Time Out: [address] PT    Future Appointments   Date Time Provider Simran Mckeon   10/25/2021  9:45 AM Cammy Stanley PT [facility] AND [facility]   10/27/2021  9:45 AM Cammy Stanley PT [facility]   11/18/2021 10:30 AM MD SHRUTI Zaragoza

## 2021-10-25 ENCOUNTER — HOSPITAL ENCOUNTER (OUTPATIENT)
Dept: PHYSICAL THERAPY | Age: 36
Discharge: HOME | End: 2021-10-25
Attending: ORTHOPAEDIC SURGERY

## 2021-10-25 NOTE — PROGRESS NOTES
Wilfredo Esposito  : 1985  Primary:   Secondary:  [facility] [place] [address]  [address] [address], [address]  [phone]   [phone]        OUTPATIENT DAILY NOTE    NAME/AGE/GENDER: Wilfredo Esposito is a 43 y.o. male. DATE: 10/25/2021    Mr. Zhu cancelled today's appt    Jerman Hurtado PT

## 2021-10-27 ENCOUNTER — HOSPITAL ENCOUNTER (OUTPATIENT)
Dept: PHYSICAL THERAPY | Age: 36
Discharge: HOME | End: 2021-10-27
Attending: ORTHOPAEDIC SURGERY

## 2021-10-27 PROCEDURE — 97110 THERAPEUTIC EXERCISES: CPT

## 2021-10-27 PROCEDURE — 97530 THERAPEUTIC ACTIVITIES: CPT

## 2021-10-27 NOTE — PROGRESS NOTES
Ninfa Up  : 1985  Payor: /  [id number] [facility] at [facility] 94. [address], [place], [address] [place] [address] Road  [phone]   [phone]        OUTPATIENT PHYSICAL THERAPY: Daily Treatment Note 10/27/2021 Visit Count:  8    Treatment Diagnosis: Pain in Left Shoulder (M25.512)  Stiffness of Left Shoulder not elsewhere specified (M25.612)  Impingement syndrome of Left Shoulder (M75.42)  Muscle Weakness, Generalized (M62.81)  Encounter for other orthopedic aftercare (Z47.89)    Precautions/Allergies:   Patient has no known allergies. 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 Pain: Initial:7/10  Medications Last Reviewed:  10/27/2021     Post Session: 6/10   Updated Objective Findings: None today        TREATMENT:   THERAPEUTIC EXERCISE: ( 25 minutes):  Exercises per grid below to improve mobility, strength and balance. Required minimal visual, verbal and manual cues to promote proper body alignment and promote proper body posture. Progressed resistance and complexity of movement as indicated.      Date:  10/6/21 Date:  10/11/21 Date:  10/13/21 Date:  10/18/21 Date:  10/20/21 Date:  10/27/21   Activity/Exercise Parameters        Education         L Stretch 3 min   3 min     Rings  5 min stretch  OH Press  short bar 2 x 5 reps  5 lb  3 x 5 reps 3 min  Additional of horizontal abd/add      LTR 90 shoulder abductio  5 min        Open book 2 min  Left/right        Chest Press 5 x 5 reps  dowel   5 lb  15x     Shoulder ER Dowel, supine yoga block  5 min   S/L  3 lb  3 x 8 reps  Dowel, supine yoga block  5 min     Wall slides 2 min  Progressed to lift off x 10 reps  2 min   Progressed to lift off 3 x 5 reps      Isometric Shoulder ER  Yellow  3 x 8 reps Red Band  3 x 8 reps      Isometric Shoulder IR  Yellow  3 x 8 reps Red Band  3 x 8 reps      Lat pull  27 lb, overhand  3 x 5 reps  30 lb  3 x 8 reps 33 lb  3 x 8 reps 37 lb  3 x 8 reps   Ws  Yellow  2 x 10 reps       UBE  3 min fwd/3 backward 3 min fwd/3 backward 3 min fwd/3 backward  lvl 1.5 3 min fwd/3 backward  lvl 1.5 3 min fwd/3 backward  lvl 1.5   DB OH Press   2 lb  3 x 5 reps  4 lb  3 x 8 reps 5lb  Curl to press  3 x 8 reps   Rows   17 lb  3 x 8 reps 20 lb  3 x 8 reps 27 lb  3 x 8 reps    Ms    Yellow  3 x 8 reps Yellow  3 x 8 reps    Serratus initiation     Red band  3 x 30 sec    Ys     Yellow  3 x 5 reps    Bent over Row     5 lb  3 x 8 reps 7 lb x 7 reps  10 lb left/right  3 x 5 reps   Kumar Carry     10 lb  4 x 100ft 15 lb  3 large laps         THERAPEUTIC ACTIVITY: (15 minutes): Activities per gid below to improve functional movement related mobility, strength and balance to improve neuro-muscular carryover to daily functional activities for improving patient's quality of life. Required visual, verbal and manual cues to promote proper body alignment and promote proper body posture/mechanics. Progressed resistance and complexity of movement as indicated. Date:  10/27/2021 Date:   Date:     Activity/Exercise Parameters Parameters Parameters   RDL Instruction with dowel, corrective with band 3 x 5 reps  30 lb 3 x 5 reps                                               MANUAL THERAPY: (0 minutes): Joint mobilization, Soft tissue mobilization was utilized and necessary because of the patient's restricted joint motion and restricted motion of soft tissue mobility. Date  10/27/2021    Technique Used Grade  Level # Time(s) Effect while being performed                                                                 HEP Log Date   Open book, LTR, wall slide with lift off 10/6/2021   2.   Isometric IR/ER, Ws 10/11/2021   3. S/l ER 10/18/2021   4.    5.           MedBridge Portal  Treatment/Session Summary:    Response to Treatment: Pt initiated on dead lift training today to prepare for return to work. Pt with increased difficulty maintaining a thoracic extension control and increasing hip and trunk flexion vs. A more upright posture. Communication/Consultation:  None today   Equipment provided today: None today   Recommendations/Intent for next treatment session:   Next visit will focus on Core Stability Pain Science Education RTC strengthening soft tissue mobilization. Treatment Plan of Care Effective Dates: 9/27/2021 TO 12/26/2021 (90 days).   Frequency/Duration: 2 times a week for 90 Days       Total Treatment Billable Duration:   40  Rx , 5 min untimed  PT Patient Time In/Time Out  Time In: 0940  Time Out: [address], PT    Future Appointments   Date Time Provider Simran Mckeon   10/29/2021 11:15 AM Ceci Damian, PT [facility] AND [facility]   11/3/2021  9:45 AM Keith Hopper, PT [facility]   11/18/2021 10:30 AM MD SHRUTI Morton

## 2021-10-29 ENCOUNTER — HOSPITAL ENCOUNTER (OUTPATIENT)
Dept: PHYSICAL THERAPY | Age: 36
Discharge: HOME | End: 2021-10-29
Attending: ORTHOPAEDIC SURGERY

## 2021-10-29 PROCEDURE — 97140 MANUAL THERAPY 1/> REGIONS: CPT

## 2021-10-29 PROCEDURE — 97110 THERAPEUTIC EXERCISES: CPT

## 2021-10-29 PROCEDURE — 97530 THERAPEUTIC ACTIVITIES: CPT

## 2021-10-29 NOTE — PROGRESS NOTES
Fred Mitchell  : 1985  Payor: /  [id number] [facility] at [address] [place] [address] [address], [facility], [address] [place] [address] Road  [phone]   [phone]        OUTPATIENT PHYSICAL THERAPY: Daily Treatment Note 10/29/2021 Visit Count:  9    Treatment Diagnosis: Pain in Left Shoulder (M25.512)  Stiffness of Left Shoulder not elsewhere specified (M25.612)  Impingement syndrome of Left Shoulder (M75.42)  Muscle Weakness, Generalized (M62.81)  Encounter for other orthopedic aftercare (Z47.89)    Precautions/Allergies:   Patient has no known allergies. 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 25% better overall. \"Walking tends to still make my shoulder hurt\"   Pain: Initial:5/10  Medications Last Reviewed:  10/29/2021     tSession: 5/10  \"My shoulder feels more loose\"   Updated Objective Findings: Observation   gait : asymmetrical  arm swing; left guarded   Posture: left scap protracted and elevated compared to right         TREATMENT:   THERAPEUTIC EXERCISE: ( 30 minutes):  Exercises per grid below to improve mobility, strength and balance. Required minimal visual, verbal and manual cues to promote proper body alignment and promote proper body posture. Progressed resistance and complexity of movement as indicated.      Date:  10/6/21 Date:  10/11/21 Date:  10/13/21 Date:  10/18/21 Date:  10/20/21 Date:  10/27/21 10/29/21   Activity/Exercise Parameters         Education       Gait mechanics-shoulder movment    L Stretch 3 min   3 min      Rings  5 min stretch  OH Press  short bar 2 x 5 reps  5 lb  3 x 5 reps 3 min  Additional of horizontal abd/add       LTR 90 shoulder abductio  5 min         Open book 2 min  Left/right Varied shoulder level  deg to address restricitons   4 min        Chest Press 5 x 5 reps  dowel   5 lb  15x      Shoulder ER Dowel, supine yoga block  5 min   S/L  3 lb  3 x 8 reps  Dowel, supine yoga block  5 min   Long holds with t-band (below)   Wall slides 2 min  Progressed to lift off x 10 reps  2 min   Progressed to lift off 3 x 5 reps       Isometric Shoulder ER  Yellow  3 x 8 reps Red Band  3 x 8 reps    T-band ring (orange) 3 x 30 sec holds   Isometric Shoulder IR  Yellow  3 x 8 reps Red Band  3 x 8 reps       Lat pull  27 lb, overhand  3 x 5 reps  30 lb  3 x 8 reps 33 lb  3 x 8 reps 37 lb  3 x 8 reps    Ws  Yellow  2 x 10 reps        UBE  3 min fwd/3 backward 3 min fwd/3 backward 3 min fwd/3 backward  lvl 1.5 3 min fwd/3 backward  lvl 1.5 3 min fwd/3 backward  lvl 1.5 3/3 fwd/bkwd level 3   DB OH Press   2 lb  3 x 5 reps  4 lb  3 x 8 reps 5lb  Curl to press  3 x 8 reps    Rows   17 lb  3 x 8 reps 20 lb  3 x 8 reps 27 lb  3 x 8 reps  Home    Ms    Yellow  3 x 8 reps Yellow  3 x 8 reps     Serratus initiation     Red band  3 x 30 sec     Ys     Yellow  3 x 5 reps  Home    Bent over Row     5 lb  3 x 8 reps 7 lb x 7 reps  10 lb left/right  3 x 5 reps    Kumar Carry     10 lb  4 x 100ft 15 lb  3 large laps 15# x 3 large laps          THERAPEUTIC ACTIVITY: (10 minutes): Activities per gid below to improve functional movement related mobility, strength and balance to improve neuro-muscular carryover to daily functional activities for improving patient's quality of life. Required visual, verbal and manual cues to promote proper body alignment and promote proper body posture/mechanics. Progressed resistance and complexity of movement as indicated.      Date:  10/27/2021 Date:  10/29/21 Date:     Activity/Exercise Parameters Parameters Parameters   RDL Instruction with dowel, corrective with band 3 x 5 reps  30 lb 3 x 5 reps     Gait activity   Varied arm swing with use of mirror for visual feedback --cues for increased left shoulder ext   10 min total practice                                         MANUAL THERAPY: (15 minutes): Joint mobilization, Soft tissue mobilization was utilized and necessary because of the patient's restricted joint motion and restricted motion of soft tissue mobility. Date  10/29/2021    Technique Used Grade  Level # Time(s) Effect while being performed   Quadrant  IV- Left shoulder  3 bouts  Decrease joint restriction multi-planes    STM/DTM Deep pressue     Left pec and bicep  5 min  Decrease abnormal muscle tone; improve joint mechanics and mobility. MFR/scar mob Skin glide  All directions; left anterior shoulder  5 min Decrease soft tissue restriction to joint movement                                             HEP Log Date   Open book, LTR, wall slide with lift off 10/6/2021   2. Isometric IR/ER, Ws 10/11/2021   3. S/l ER 10/18/2021   4.    5.           [facility] Portal  Treatment/Session Summary:    Response to Treatment:   Due to subjective report of pain after walking routine, spent time evaluation and working on gait mechanics to decrease shoulder irritation . Pt tends to guard against left shoulder extension and hold in IR, demonstrates tight pec and bicep. Addressed with active stretching and manual tissue work. Reduction in muscle guarding and improved shoulder extension and ER during gait after treatment. Pt reports feeling mor mobility. Encouraged to continue with all HEP and will resume full strengthening progression next session. Communication/Consultation:  None today   Equipment provided today: t-band ring (peach color)   Recommendations/Intent for next treatment session:   Next visit will focus on Core Stability Pain Science Education RTC strengthening soft tissue mobilization. Treatment Plan of Care Effective Dates: 9/27/2021 TO 12/26/2021 (90 days).   Frequency/Duration: 2 times a week for 90 Days       Total Treatment Billable Duration: 55 min   PT Patient Time In/Time Out  Time In: 1120  Time Out: [address], PT    Future Appointments   Date Time Provider Simran Mckeon   11/3/2021  9:45 AM Dusty Ball, PT [facility] AND [facility]   11/5/2021  9:45 AM Jose Francisco Arredondo, PT [facility]   11/8/2021 10:30 AM Dusty Ball, PT [facility]   11/10/2021  9:45 AM [facility]   11/15/2021 10:30 AM [facility], [facility]   11/17/2021  9:45 AM Dusty Ball, [facility]   11/18/2021 10:30 AM Rodrigo Lovett MD [facility]   11/22/2021 10:30 AM Dusty Ball, [facility]   11/24/2021  9:45 AM Dusty Ball, [facility]

## 2021-11-03 ENCOUNTER — HOSPITAL ENCOUNTER (OUTPATIENT)
Dept: PHYSICAL THERAPY | Age: 36
Discharge: HOME | End: 2021-11-03
Attending: ORTHOPAEDIC SURGERY

## 2021-11-03 PROCEDURE — 97530 THERAPEUTIC ACTIVITIES: CPT

## 2021-11-03 PROCEDURE — 97110 THERAPEUTIC EXERCISES: CPT

## 2021-11-03 NOTE — PROGRESS NOTES
Kavita Bloom  : 1985  Payor: /  [id number] [facility]. [address], [facility], [address] [place] [address] Road  [phone]   [phone]        OUTPATIENT PHYSICAL THERAPY: Daily Treatment Note 11/3/2021 Visit Count:  10    Treatment Diagnosis: Pain in Left Shoulder (M25.512)  Stiffness of Left Shoulder not elsewhere specified (M25.612)  Impingement syndrome of Left Shoulder (M75.42)  Muscle Weakness, Generalized (M62.81)  Encounter for other orthopedic aftercare (Z47.89)    Precautions/Allergies:   Patient has no known allergies. 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 Pt reports he has been walking he has walked 4 miles most days of the week and less pain afterwards in shoulder. Pt with improvement with sleeping has a bit of dull pain that can be managed with ice. Pain: Initial: does not rate/10  Medications Last Reviewed:  11/3/2021     tSession: does not rate/10     Updated Objective Findings: See progress report        TREATMENT:   THERAPEUTIC EXERCISE: ( 25 minutes):  Exercises per grid below to improve mobility, strength and balance. Required minimal visual, verbal and manual cues to promote proper body alignment and promote proper body posture. Progressed resistance and complexity of movement as indicated.      Date:  10/18/21 Date:  10/20/21 Date:  10/27/21 10/29/21 Date:  11/3/21   Activity/Exercise        Education    Gait mechanics-shoulder movment  Assessment of goals, ROM, and QuickDash   L Stretch 3 min       Rings     5 min in all planes   Open book    Varied shoulder level  deg to address restricitons   4 min         Chest Press 5 lb  15x       Shoulder ER S/L  3 lb  3 x 8 reps  Dowel, supine yoga block  5 min   Long holds with t-band (below)    Isometric Shoulder ER    T-band ring (orange) 3 x 30 sec holds    Face pull aparts     Yellow  3 x 8 reps   Lat pull 30 lb  3 x 8 reps 33 lb  3 x 8 reps 37 lb  3 x 8 reps     UBE 3 min fwd/3 backward  lvl 1.5 3 min fwd/3 backward  lvl 1.5 3 min fwd/3 backward  lvl 1.5 3/3 fwd/bkwd level 3 3/3 fwd/bkwd level 3   DB OH Press  4 lb  3 x 8 reps 5lb  Curl to press  3 x 8 reps     Rows 20 lb  3 x 8 reps 27 lb  3 x 8 reps  Home     Ms Yellow  3 x 8 reps Yellow  3 x 8 reps      Serratus initiation  Red band  3 x 30 sec      Ys  Yellow  3 x 5 reps  Home  Yellow  3 x 8 reps  standing   Bent over Row  5 lb  3 x 8 reps 7 lb x 7 reps  10 lb left/right  3 x 5 reps     Kumar Carry  10 lb  4 x 100ft 15 lb  3 large laps 15# x 3 large laps       Towel Stretch     10 x 10 sec         THERAPEUTIC ACTIVITY: ( 30 minutes): Activities per gid below to improve functional movement related mobility, strength and balance to improve neuro-muscular carryover to daily functional activities for improving patient's quality of life. Required visual, verbal and manual cues to promote proper body alignment and promote proper body posture/mechanics. Progressed resistance and complexity of movement as indicated.      Date:  10/27/2021 Date:  10/29/21 Date:  11/3/21   Activity/Exercise Parameters Parameters Parameters   RDL Instruction with dowel, corrective with band 3 x 5 reps  30 lb 3 x 5 reps  Corrective with dowel and   Yellow t-band  2 x 8 reps  Progressed to 20 ln short bar  2 x 5 reps   Gait activity   Varied arm swing with use of mirror for visual feedback --cues for increased left shoulder ext   10 min total practice     Kumar Carry   15 lb   3 large laps   Dead lift   20 lb on block  3 x 5 reps  Increased time for instruction and positioning                           MANUAL THERAPY: (0 minutes): Joint mobilization, Soft tissue mobilization was utilized and necessary because of the patient's restricted joint motion and restricted motion of soft tissue mobility. Date  11/3/2021    Technique Used Grade  Level # Time(s) Effect while being performed   Quadrant  IV- Left shoulder  3 bouts  Decrease joint restriction multi-planes    STM/DTM Deep pressue     Left pec and bicep  5 min  Decrease abnormal muscle tone; improve joint mechanics and mobility. MFR/scar mob Skin glide  All directions; left anterior shoulder  5 min Decrease soft tissue restriction to joint movement                                             HEP Log Date   Open book, LTR, wall slide with lift off 10/6/2021   2. Isometric IR/ER, Ws 10/11/2021   3. S/l ER 10/18/2021   4.    5.           [facility] Portal  Treatment/Session Summary:    Response to Treatment: Pt making progress toward goals with normalizing ROM. Pt to continue with strengthening in order to prepare for return to work. See progress report for details. Pt responds well to use of visual cue to improve positioning of trunk during dead lift. Pt has tendency to have a more perpendicular than parallel trunk during dead lift impacting mechanics and bar path   Communication/Consultation:  None today   Equipment provided today: None ricarda   Recommendations/Intent for next treatment session:   Next visit will focus on Core Stability Pain Science Education RTC strengthening soft tissue mobilization. Treatment Plan of Care Effective Dates: 9/27/2021 TO 12/26/2021 (90 days).   Frequency/Duration: 2 times a week for 90 Days       Total Treatment Billable Duration: 55 min   PT Patient Time In/Time Out  Time In: 0930  Time Out: 1025  Joseph Chamberlain PT    Future Appointments   Date Time Provider Simran Mckeon   11/5/2021  9:45 AM [facility]   11/8/2021 10:30 AM Ramírez Miranda PT [facility]   11/10/2021  9:45 AM Ramírez Miranda PT [facility]   11/15/2021 10:30 AM Lesvia Jhaveri, PT [facility]   11/17/2021  9:45 AM Lesvia Jhaveri, PT [facility]   11/18/2021 10:30 AM Triny Lux MD [facility]   11/22/2021 10:30 AM Lesvia Jhaveri, PT [facility]   11/24/2021  9:45 AM Lesvia Jhaveri, PT [facility]

## 2021-11-03 NOTE — PROGRESS NOTES
Demetria Yang  : 1985  Primary:   Secondary:  [facility] Center at . [facility]  [facility]. [facility], [address]  [phone]   [phone]       Visit Count:  10     OUTPATIENT PHYSICAL THERAPY:Initial Assessment 11/3/2021   Treatment Diagnosis: Pain in Left Shoulder (M25.512)  Stiffness of Left Shoulder not elsewhere specified (M25.612)  Impingement syndrome of Left Shoulder (M75.42)  Muscle Weakness, Generalized (M62.81)  Encounter for other orthopedic aftercare (Z47.89)    Precautions/Allergies:   Patient has no known allergies. MD Orders: evaluate and treat  MEDICAL/REFERRING DIAGNOSIS:  Tendinopathy of left biceps   DATE OF ONSET: 21  REFERRING PHYSICIAN: James Olivo MD  RETURN PHYSICIAN APPOINTMENT: Oct 14, 2021     Progress note ASSESSMENT:  Mr. Niya Herrera is progressing toward all goals at this time. Pt is normalizing ROM to be symmetrical and comparable to right side noted below. Pt reports that sleep and overall activity level are improving. Pt main focus of therapy at this time is to build strength in shoulder complex and general conditioning in order to prepare for return to work. Pt is taking steps to improve his overall wellness including diet, walking, and implementing his HEP. Patient will benefit from skilled physical therapy for manual therapeutic techniques (as appropriate), therapeutic exercises and activities, neuromuscular re-education, and comprehensive home exercises program to address current impairments and functional limitations. PROBLEM LIST (Impacting functional limitations):  1. Decreased Strength  2. Decreased ADL/Functional Activities  3. Increased Pain  4. Decreased Activity Tolerance  5. Increased Fatigue  6. Decreased Flexibility/Joint Mobility  7. Edema/Girth  8. Decreased Knowledge of Precautions  9.  Decreased Trinity with Home Exercise Program INTERVENTIONS PLANNED: (Treatment may consist of any combination of the following)  1. Balance Exercise  2. Cold  3. Cryotherapy  4. Family Education  5. Heat  6. Home Exercise Program (HEP)  7. Manual Therapy  8. Neuromuscular Re-education/Strengthening  9. Range of Motion (ROM)  10. Therapeutic Activites  11. Therapeutic Exercise/Strengthening  12. Transcutaneous Electrical Nerve Stimulation (TENS)   TREATMENT PLAN:  Effective Dates: 9/27/2021 TO 12/26/2021 (90 days). Frequency/Duration: 2 times a week for 90 Day(s)    GOALS: (Goals have been discussed and agreed upon with patient.)    Goals: 45 days  Goal Met   1. Cynthia Ayala will be independent with HEP to maintain functional gains made with therapy intervention. 1.  [] Date: progressing, therapist continues to modify   2. Cynthia Ayala will demonstrate increased shoulder flexion  to 165 degrees without pain to promote active use for overhead reaching. 2.  [] Date: progressing 162 dg   3. Cynthia Ayala will demonstrate increased shoulder external rotation to 70 degrees  At 90 dg abduction without pain to promote donning of seat belt. Goal range wrote in error at 70dg at 90 dg abduction - should read 25 dg at 90 dg abduction 3. [] Date: progressing 15 dg   4. Cynthia Ayala will report sleeping through the night and not waking due to shoulder pain. 4.  [] Date: progressing sleeping approx 4 hours   5. Cynthia Ayala will demonstrate a 20 point increase on the DASH to indicate decreased perceived disability and improved participation in ADLs/IADLs. 5.  [] Date: progressing 4 point improvement   6. Cynthia Ayala will demonstrate ability to carry 50lb in a box while walking >= 150ft in order to return to work. 6.  [] Date: progressing carrying 30 lbs   7. Cynthia Ayala will demonstrate appropriate lifting technique from floor to waist level with 50 lbs and no cues from therapist to complete work related responsibilities 7. [] Date: progressing lifting 20 lbs   8.  Pierre Douglas Xuan Bernardo will demonstrate ability to lift 45 lbs overhead in order to promote ability to complete household chores. 8.  [] Date: not met   9. Kevyn Hackett will demonstrate ability to pull 30 lbs in order to complete yardwork and household chores. 9.  [] Date: progressing 27 lb       OUTCOME MEASURE:   Tool Used: Disabilities of the Arm, Shoulder and Hand (DASH) Questionnaire - Quick Version  Score:  Initial: 40/55  Most Recent: 36/55 (Date: 11/3/21 )   Interpretation of Score: The DASH is designed to measure the activities of daily living in person's with upper extremity dysfunction or pain. Each section is scored on a 1-5 scale, 5 representing the greatest disability. The scores of each section are added together for a total score of 55. MEDICAL NECESSITY:   · Patient is expected to demonstrate progress in strength, range of motion, functional technique and postural control/awareness  · Skilled intervention continues to be required due to deficits and impairments seen upon initial evaluation affecting patient's participation in ADLs and functional tasks. REASON FOR SERVICES/OTHER COMMENTS:  · Patient continues to require present interventions due to patient's inability to lift, carry, push, pull, lift overhead  · Patient continues to require skilled intervention due to deficits and impairments seen upon initial evaluation affecting patient's participation in ADLs and functional tasks. Total Duration: 55 minutes - see daily note  PT Patient Time In/Time Out  Time In: 0930  Time Out: 1025    Rehabilitation Potential For Stated Goals: Good  Regarding Savannah Zhu's therapy, I certify that the treatment plan above will be carried out by a therapist or under their direction.   Thank you for this referral,  Madi Sageo, PT     Referring Physician Signature: Sebas Pires MD                 EXAMINATION:   ROM:    AROM/PROM         Joint: Eval Date: 9/27/21  Re-Assess Date: 11/3/21  Re-Assess Date:    AAROM (standing) RIGHT LEFT Left Right RIGHT LEFT   Shoulder Flexion 140 175 162      Shoulder Abduction 87 160 168      Shoulder External Rotation (Apley's) 60 dg WNL 15 dg at 90 dg abduction 31 dg at 90 dg abduction     Elbow ROM WNL WNL       IR   L3        Strength:    Joint: Eval Date: 9/27/21  Re-Assess Date:  Re-Assess Date:     RIGHT LEFT RIGHT LEFT RIGHT LEFT   Shoulder Flexion 3/5 4-/5       Shoulder Abduction  (C5) 3/5 4-/5       Shoulder Internal Rotation 3/5 4-/5       Shoulder External Rotation 3/5 4-/5       Elbow Flexion  (C6) 3/5 4+/5       Strength:  0-5 scale, 0 no muscle contraction; 1 no joint motion but contraction felt; 2 -less than full ROM in gravity eliminated; 2 full ROM in grav eliminated; 2+ full ROM in grav eliminated and sally to withstand minimal resist; 3-less than full ROM against gravity; 3 full ROM against gravity;  3+ full ROM against gravity and able to withstand minimal resist; 4- full ROM against gravity and able to withstand less than mod resist; 4 full ROM against gravity and able to withstand mod resist; 5 full ROM against gravity and able to withstand max resist.

## 2021-11-05 ENCOUNTER — HOSPITAL ENCOUNTER (OUTPATIENT)
Dept: PHYSICAL THERAPY | Age: 36
Discharge: HOME | End: 2021-11-05
Attending: ORTHOPAEDIC SURGERY

## 2021-11-05 NOTE — PROGRESS NOTES
Roma Rivers  : 1985  Primary:   Secondary:  Therapy Center at [facility] [place] [address], [address]  [phone]   [phone]        OUTPATIENT DAILY NOTE    NAME/AGE/GENDER: Roma Rivers is a 43 y.o. male. DATE: 2021    Mr. Pam Verma for today's appointment due to illness.     Arnaldo Roa, PT

## 2021-11-08 ENCOUNTER — HOSPITAL ENCOUNTER (OUTPATIENT)
Dept: PHYSICAL THERAPY | Age: 36
Discharge: HOME | End: 2021-11-08
Attending: ORTHOPAEDIC SURGERY

## 2021-11-08 PROCEDURE — 97110 THERAPEUTIC EXERCISES: CPT

## 2021-11-08 NOTE — PROGRESS NOTES
Gonzalo Osgood  : 1985  Payor: /  [id number] [facility]. [address], [facility], [address] [place] [address] Road  [phone]   [phone]        OUTPATIENT PHYSICAL THERAPY: Daily Treatment Note 2021 Visit Count:  11    Treatment Diagnosis: Pain in Left Shoulder (M25.512)  Stiffness of Left Shoulder not elsewhere specified (M25.612)  Impingement syndrome of Left Shoulder (M75.42)  Muscle Weakness, Generalized (M62.81)  Encounter for other orthopedic aftercare (Z47.89)    Precautions/Allergies:   Patient has no known allergies. MD Orders: evaluate and treat  MEDICAL/REFERRING DIAGNOSIS:  Surgery follow-up [Z09]  Tendinopathy of left biceps [M67.922]   DATE OF ONSET: 21  REFERRING PHYSICIAN: Ryan Suarez MD  RETURN PHYSICIAN APPOINTMENT: Oct 14, 2021       Pre-treatment Symptoms/Complaints: Pt reports he is doing better, but has less activity over weekend due to a head cold. Pain: Initial: does not rate/10  Medications Last Reviewed:  2021     tSession: does not rate/10     Updated Objective Findings: See progress report      TREATMENT:   THERAPEUTIC EXERCISE: ( 20 minutes):  Exercises per grid below to improve mobility, strength and balance. Required minimal visual, verbal and manual cues to promote proper body alignment and promote proper body posture. Progressed resistance and complexity of movement as indicated.      Date:  10/20/21 Date:  10/27/21 10/29/21 Date:  11/3/21 Date:  21   Activity/Exercise        Education   Gait mechanics-shoulder movment  Assessment of goals, ROM, and QuickDash    Rings    5 min in all planes    Open book   Varied shoulder level  deg to address restricitons   4 min          Shoulder ER   Long holds with t-band (below)     Isometric Shoulder ER   T-band ring (orange) 3 x 30 sec holds     Face pull aparts    Yellow  3 x 8 reps    Lat pull 33 lb  3 x 8 reps 37 lb  3 x 8 reps      UBE 3 min fwd/3 backward  lvl 1.5 3 min fwd/3 backward  lvl 1.5 3/3 fwd/bkwd level 3 3/3 fwd/bkwd level 3    DB OH Press 4 lb  3 x 8 reps 5lb  Curl to press  3 x 8 reps   7 lb  3 x 8 reps   Rows 27 lb  3 x 8 reps  Home      Ms Yellow  3 x 8 reps       Serratus initiation Red band  3 x 30 sec    Red band, 3lb  3 x 8reps   Ys Yellow  3 x 5 reps  Home  Yellow  3 x 8 reps  standing    Bent over Row 5 lb  3 x 8 reps 7 lb x 7 reps  10 lb left/right  3 x 5 reps   10 lb  3 x 8 reps  Left/right   Kumar Carry 10 lb  4 x 100ft 15 lb  3 large laps 15# x 3 large laps        Towel Stretch    10 x 10 sec    Treadmill     3.0 mph, 63 calories, 10 incline   Scap [place]     Yellow  3 x 8 reps         THERAPEUTIC ACTIVITY: ( 25 minutes): Activities per gid below to improve functional movement related mobility, strength and balance to improve neuro-muscular carryover to daily functional activities for improving patient's quality of life. Required visual, verbal and manual cues to promote proper body alignment and promote proper body posture/mechanics. Progressed resistance and complexity of movement as indicated. Date:  10/27/2021 Date:  10/29/21 Date:  11/3/21 Date:  11/8/21   Activity/Exercise Parameters Parameters Parameters    RDL Instruction with dowel, corrective with band 3 x 5 reps  30 lb 3 x 5 reps  Corrective with dowel and   Yellow t-band  2 x 8 reps  Progressed to 20 ln short bar  2 x 5 reps    Gait activity   Varied arm swing with use of mirror for visual feedback --cues for increased left shoulder ext   10 min total practice      Kumar Carry   15 lb   3 large laps 20 lb  3 large laps   Dead lift   20 lb on block  3 x 5 reps  Increased time for instruction and positioning 30 lb x 5 reps  35 lb 2 x 8 reps                              MANUAL THERAPY: (0 minutes): Joint mobilization, Soft tissue mobilization was utilized and necessary because of the patient's restricted joint motion and restricted motion of soft tissue mobility. Date  11/8/2021    Technique Used Grade  Level # Time(s) Effect while being performed   Quadrant  IV- Left shoulder  3 bouts  Decrease joint restriction multi-planes    STM/DTM Deep pressue     Left pec and bicep  5 min  Decrease abnormal muscle tone; improve joint mechanics and mobility. MFR/scar mob Skin glide  All directions; left anterior shoulder  5 min Decrease soft tissue restriction to joint movement                                             HEP Log Date   Open book, LTR, wall slide with lift off 10/6/2021   2. Isometric IR/ER, Ws 10/11/2021   3. S/l ER 10/18/2021   4.    5.           [employer] Portal  Treatment/Session Summary:    Response to Treatment: Pt requires cues for thoracic and scapular control during dead lift, but able to maintain a more horizontal back posture today. Pt continues to show progression of weights with lifting, carrying, and pushing overhead. Communication/Consultation:  None today   Equipment provided today: None ricarda   Recommendations/Intent for next treatment session:   Next visit will focus on Core Stability Pain Science Education RTC strengthening soft tissue mobilization. BEGIN Squat next visit       Treatment Plan of Care Effective Dates: 9/27/2021 TO 12/26/2021 (90 days).   Frequency/Duration: 2 times a week for 90 Days       Total Treatment Billable Duration: 45 min   PT Patient Time In/Time Out  Time In: 1030  Time Out: 1115  Murali Awad PT    Future Appointments   Date Time Provider Simran Mckeon   11/10/2021  9:45 AM [facility]   11/15/2021 10:30 AM Kevynradha Willard PT [facility]   11/17/2021  9:45 AM Socorroradha Willard, PT [facility]   11/18/2021 10:30 AM Lars Perry MD [facility]   11/22/2021 10:30 AM Kevynradha Willard, PT [facility]   11/24/2021  9:45 AM Socorro Ket, PT [facility]

## 2021-11-10 ENCOUNTER — HOSPITAL ENCOUNTER (OUTPATIENT)
Dept: PHYSICAL THERAPY | Age: 36
Discharge: HOME | End: 2021-11-10
Attending: ORTHOPAEDIC SURGERY

## 2021-11-10 PROCEDURE — 97530 THERAPEUTIC ACTIVITIES: CPT

## 2021-11-10 PROCEDURE — 97110 THERAPEUTIC EXERCISES: CPT

## 2021-11-10 NOTE — PROGRESS NOTES
Diane Sport  : 1985  Payor: /  Fiona Monroy at [address] [place] [address] [address], [place], [address]  [phone]   [phone]        OUTPATIENT PHYSICAL THERAPY: Daily Treatment Note 11/10/2021 Visit Count:  12    Treatment Diagnosis: Pain in Left Shoulder (M25.512)  Stiffness of Left Shoulder not elsewhere specified (M25.612)  Impingement syndrome of Left Shoulder (M75.42)  Muscle Weakness, Generalized (M62.81)  Encounter for other orthopedic aftercare (Z47.89)    Precautions/Allergies:   Patient has no known allergies. MD Orders: evaluate and treat  MEDICAL/REFERRING DIAGNOSIS:  Surgery follow-up [Z09]  Tendinopathy of left biceps [M67.922]   DATE OF ONSET: 21  REFERRING PHYSICIAN: Rachele Melendez MD  RETURN PHYSICIAN APPOINTMENT: Oct 14, 2021       Pre-treatment Symptoms/Complaints: Pt reports he was a little sore this morning, but overall doing well. Pain: Initial: does not rate/10  Medications Last Reviewed:  11/10/2021     tSession: does not rate/10     Updated Objective Findings: See progress report      TREATMENT:   THERAPEUTIC EXERCISE: ( 25 minutes):  Exercises per grid below to improve mobility, strength and balance. Required minimal visual, verbal and manual cues to promote proper body alignment and promote proper body posture. Progressed resistance and complexity of movement as indicated.      Date:  10/20/21 Date:  10/27/21 10/29/21 Date:  11/3/21 Date:  21 Date:  11/10/21   Activity/Exercise         Education   Gait mechanics-shoulder movment  Assessment of goals, ROM, and QuickDash     Rings    5 min in all planes     Open book   Varied shoulder level  deg to address restricitons   4 min           Shoulder ER   Long holds with t-band (below)      Isometric Shoulder ER   T-band ring (orange) 3 x 30 sec holds      Face pull aparts    Yellow  3 x 8 reps  Yellow  3 x 8 reps   Lat pull 33 lb  3 x 8 reps 37 lb  3 x 8 reps 40 lb  3 x 8 reps   UBE 3 min fwd/3 backward  lvl 1.5 3 min fwd/3 backward  lvl 1.5 3/3 fwd/bkwd level 3 3/3 fwd/bkwd level 3     DB OH Press 4 lb  3 x 8 reps 5lb  Curl to press  3 x 8 reps   7 lb  3 x 8 reps 6 lb  3 x 8 reps   Rows 27 lb  3 x 8 reps  Home    30 lb  3 x 8 reps   Ms Yellow  3 x 8 reps        Serratus initiation Red band  3 x 30 sec    Red band, 3lb  3 x 8reps Red band, 4lb  3 x 5 reps   Ys Yellow  3 x 5 reps  Home  Yellow  3 x 8 reps  standing     Bent over Row 5 lb  3 x 8 reps 7 lb x 7 reps  10 lb left/right  3 x 5 reps   10 lb  3 x 8 reps  Left/right    Kumar Carry 10 lb  4 x 100ft 15 lb  3 large laps 15# x 3 large laps         Towel Stretch    10 x 10 sec     Treadmill     3.0 mph, 63 calories, 10 incline 2.3 mph, 66 calories, 15 incline   Scap [place]     Yellow  3 x 8 reps    Box Squat      2 x 8 reps with band OH         THERAPEUTIC ACTIVITY: ( 15 minutes): Activities per gid below to improve functional movement related mobility, strength and balance to improve neuro-muscular carryover to daily functional activities for improving patient's quality of life. Required visual, verbal and manual cues to promote proper body alignment and promote proper body posture/mechanics. Progressed resistance and complexity of movement as indicated.      Date:  10/27/2021 Date:  10/29/21 Date:  11/3/21 Date:  11/8/21 Date:  11/10/21   Activity/Exercise Parameters Parameters Parameters     RDL Instruction with dowel, corrective with band 3 x 5 reps  30 lb 3 x 5 reps  Corrective with dowel and   Yellow t-band  2 x 8 reps  Progressed to 20 ln short bar  2 x 5 reps     Gait activity   Varied arm swing with use of mirror for visual feedback --cues for increased left shoulder ext   10 min total practice       Kumar Carry   15 lb   3 large laps 20 lb  3 large laps    Dead lift   20 lb on block  3 x 5 reps  Increased time for instruction and positioning 30 lb x 5 reps  35 lb 2 x 8 reps    Squat     instruction in high bar squat, positioning of barbell, safety in weight rack  Dowel only  2 x 8 reps                         MANUAL THERAPY: (0 minutes): Joint mobilization, Soft tissue mobilization was utilized and necessary because of the patient's restricted joint motion and restricted motion of soft tissue mobility. Date  11/10/2021    Technique Used Grade  Level # Time(s) Effect while being performed   Quadrant  IV- Left shoulder  3 bouts  Decrease joint restriction multi-planes    STM/DTM Deep pressue     Left pec and bicep  5 min  Decrease abnormal muscle tone; improve joint mechanics and mobility. MFR/scar mob Skin glide  All directions; left anterior shoulder  5 min Decrease soft tissue restriction to joint movement                                             HEP Log Date   Open book, LTR, wall slide with lift off 10/6/2021   2. Isometric IR/ER, Ws 10/11/2021   3. S/l ER 10/18/2021   4.    5.           [employer] Portal  Treatment/Session Summary:    Response to Treatment: Pt responds well to use of box to improve squat depth and become more hip dominant vs knee dominant. Pt with reported anterior shoulder tightness that is relieved with repositioning to high bar position and a wider  on bar. Pt demos good scapular activation and holding with squat. Communication/Consultation:  None today   Equipment provided today: None today   Recommendations/Intent for next treatment session:   Next visit will focus on Core Stability Pain Science Education RTC strengthening soft tissue mobilization. BEGIN Squat next visit       Treatment Plan of Care Effective Dates: 9/27/2021 TO 12/26/2021 (90 days).   Frequency/Duration: 2 times a week for 90 Days       Total Treatment Billable Duration: 45 min   PT Patient Time In/Time Out  Time In: 0945  Time Out: [address] [facility], PT    Future Appointments   Date Time Provider Simran Mckeon   11/15/2021 10:30 AM Mello Cuba, PT [facility]   11/17/2021  9:45 AM Stacie Hopson PT [facility]   11/18/2021 10:30 AM Lucia Crews MD [facility]   11/22/2021 10:30 AM Stacie Hopson PT [facility]   11/24/2021  9:45 AM Stacie Hopson PT [facility]

## 2021-11-15 ENCOUNTER — HOSPITAL ENCOUNTER (OUTPATIENT)
Dept: PHYSICAL THERAPY | Age: 36
Discharge: HOME | End: 2021-11-15
Attending: ORTHOPAEDIC SURGERY

## 2021-11-15 PROCEDURE — 97110 THERAPEUTIC EXERCISES: CPT

## 2021-11-15 PROCEDURE — 97530 THERAPEUTIC ACTIVITIES: CPT

## 2021-11-15 NOTE — PROGRESS NOTES
Arias Castillo  : 1985  Payor: /  Jamal Young at [address] [place] [address]. [facility] [address], [facility], [id number] [place] [address] Road  [phone]   [phone]        OUTPATIENT PHYSICAL THERAPY: Daily Treatment Note 11/15/2021 Visit Count:  13    Treatment Diagnosis: Pain in Left Shoulder (M25.512)  Stiffness of Left Shoulder not elsewhere specified (M25.612)  Impingement syndrome of Left Shoulder (M75.42)  Muscle Weakness, Generalized (M62.81)  Encounter for other orthopedic aftercare (Z47.89)    Precautions/Allergies:   Patient has no known allergies. 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 Pain: Initial: does not rate/10  Medications Last Reviewed:  11/15/2021     tSession: does not rate/10     Updated Objective Findings: See progress report      TREATMENT:   THERAPEUTIC EXERCISE: ( 25 minutes):  Exercises per grid below to improve mobility, strength and balance. Required minimal visual, verbal and manual cues to promote proper body alignment and promote proper body posture. Progressed resistance and complexity of movement as indicated.      Date:  10/20/21 Date:  10/27/21 10/29/21 Date:  11/3/21 Date:  21 Date:  11/10/21 Date:  11/15/21   Activity/Exercise          Education   Gait mechanics-shoulder movment  Assessment of goals, ROM, and QuickDash      Rings    5 min in all planes      Open book   Varied shoulder level  deg to address restricitons   4 min         1/2 kneeling  12x  Left/right   Shoulder ER   Long holds with t-band (below)       Isometric Shoulder ER   T-band ring (orange) 3 x 30 sec holds       Face pull aparts    Yellow  3 x 8 reps  Yellow  3 x 8 reps    Lat pull 33 lb  3 x 8 reps 37 lb  3 x 8 reps    40 lb  3 x 8 reps 43 lb  3 x 8 reps   UBE 3 min fwd/3 backward  lvl 1.5 3 min fwd/3 backward  lvl 1.5 3/3 fwd/bkwd level 3 3/3 fwd/bkwd level 3      DB OH Press 4 lb  3 x 8 reps 5lb  Curl to press  3 x 8 reps   7 lb  3 x 8 reps 6 lb  3 x 8 reps    Rows 27 lb  3 x 8 reps  Home    30 lb  3 x 8 reps 33 lb  3 x 8 reps   Ms Yellow  3 x 8 reps         Serratus initiation Red band  3 x 30 sec    Red band, 3lb  3 x 8reps Red band, 4lb  3 x 5 reps Red band  4 lb  3 x 8 reps   Ys Yellow  3 x 5 reps  Home  Yellow  3 x 8 reps  standing      Bent over Row 5 lb  3 x 8 reps 7 lb x 7 reps  10 lb left/right  3 x 5 reps   10 lb  3 x 8 reps  Left/right     Kumar Carry 10 lb  4 x 100ft 15 lb  3 large laps 15# x 3 large laps       20 lb  3 laps   Towel Stretch    10 x 10 sec      Treadmill     3.0 mph, 63 calories, 10 incline 2.3 mph, 66 calories, 15 incline 8 min  66 calories   Scap [place]     Yellow  3 x 8 reps  90/90  With OH press  3 x 8 reps   Box Squat      2 x 8 reps with band OH 20 lb  17 in  12 x   Self thoracic spinal extension       Chair  12 x         THERAPEUTIC ACTIVITY: ( 15 minutes): Activities per gid below to improve functional movement related mobility, strength and balance to improve neuro-muscular carryover to daily functional activities for improving patient's quality of life. Required visual, verbal and manual cues to promote proper body alignment and promote proper body posture/mechanics. Progressed resistance and complexity of movement as indicated.      Date:  10/27/2021 Date:  10/29/21 Date:  11/3/21 Date:  11/8/21 Date:  11/10/21 Date:  11/15/21   Activity/Exercise Parameters Parameters Parameters      RDL Instruction with dowel, corrective with band 3 x 5 reps  30 lb 3 x 5 reps  Corrective with dowel and   Yellow t-band  2 x 8 reps  Progressed to 20 ln short bar  2 x 5 reps      Gait activity   Varied arm swing with use of mirror for visual feedback --cues for increased left shoulder ext   10 min total practice        Kumar Carry   15 lb   3 large laps 20 lb  3 large laps     Dead lift   20 lb on block  3 x 5 reps  Increased time for instruction and positioning 30 lb x 5 reps  35 lb 2 x 8 reps  45 lb  3 x 8 reps   Squat     instruction in high bar squat, positioning of barbell, safety in weight rack  Dowel only  2 x 8 reps                            MANUAL THERAPY: (0 minutes): Joint mobilization, Soft tissue mobilization was utilized and necessary because of the patient's restricted joint motion and restricted motion of soft tissue mobility. Date  11/15/2021    Technique Used Grade  Level # Time(s) Effect while being performed   Quadrant  IV- Left shoulder  3 bouts  Decrease joint restriction multi-planes    STM/DTM Deep pressue     Left pec and bicep  5 min  Decrease abnormal muscle tone; improve joint mechanics and mobility. MFR/scar mob Skin glide  All directions; left anterior shoulder  5 min Decrease soft tissue restriction to joint movement                                             HEP Log Date   Open book, LTR, wall slide with lift off 10/6/2021   2. Isometric IR/ER, Ws 10/11/2021   3. S/l ER 10/18/2021   4.    5.           MedBridge Portal  Treatment/Session Summary:    Response to Treatment: Pt with improving technique with dead lift as requires min cues for shoulder positioning in relation to bar. Pt with decreased thoracic mobility and scapular strength impacting dead lift technique. Pt continues to show improvements in technique more specifically with weight distribution on foot. Communication/Consultation:  None today   Equipment provided today: None today   Recommendations/Intent for next treatment session:   Next visit will focus on Core Stability Pain Science Education RTC strengthening soft tissue mobilization. BEGIN Squat next visit       Treatment Plan of Care Effective Dates: 9/27/2021 TO 12/26/2021 (90 days).   Frequency/Duration: 2 times a week for 90 Days       Total Treatment Billable Duration: 40 min, 5 min untimed due to rest  PT Patient Time In/Time Out  Time In: 1025  Time Out: Rainer Bruce, PT    Future Appointments   Date Time Provider Simran Mckeon   11/15/2021 10:30 AM Yina Neri, PT [facility]   11/17/2021  9:45 AM Yina Neri, PT [facility]   11/18/2021 10:30 AM Sakina Francisco MD [facility]   11/22/2021 10:30 AM Yina Neri, PT [facility]   11/24/2021  9:45 AM Yina Neri, PT [facility]

## 2021-11-17 ENCOUNTER — HOSPITAL ENCOUNTER (OUTPATIENT)
Dept: PHYSICAL THERAPY | Age: 36
Discharge: HOME | End: 2021-11-17
Attending: ORTHOPAEDIC SURGERY

## 2021-11-17 PROCEDURE — 97530 THERAPEUTIC ACTIVITIES: CPT

## 2021-11-17 PROCEDURE — 97110 THERAPEUTIC EXERCISES: CPT

## 2021-11-17 NOTE — PROGRESS NOTES
Carissa Quinonez  : 1985  Payor: /  [id number] at [facility]. [address], [place], [address] [place] [address] Road  [phone]   [phone]        OUTPATIENT PHYSICAL THERAPY: Daily Treatment Note 2021 Visit Count:  14    Treatment Diagnosis: Pain in Left Shoulder (M25.512)  Stiffness of Left Shoulder not elsewhere specified (M25.612)  Impingement syndrome of Left Shoulder (M75.42)  Muscle Weakness, Generalized (M62.81)  Encounter for other orthopedic aftercare (Z47.89)    Precautions/Allergies:   Patient has no known allergies. 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 Pt to f/u with MD tomorrow. Pain: Initial: does not rate/10  Medications Last Reviewed:  2021     tSession: does not rate/10     Updated Objective Findings: See progress report      TREATMENT:   THERAPEUTIC EXERCISE: ( 15 minutes):  Exercises per grid below to improve mobility, strength and balance. Required minimal visual, verbal and manual cues to promote proper body alignment and promote proper body posture. Progressed resistance and complexity of movement as indicated.      Date:  11/3/21 Date:  21 Date:  11/10/21 Date:  11/15/21 Date:  21   Activity/Exercise        Education Assessment of goals, ROM, and QuickDash       Rings 5 min in all planes       Open book    1/2 kneeling  12x  Left/right 1/2 kneeling  12x  Left/right   Face pull aparts Yellow  3 x 8 reps  Yellow  3 x 8 reps     Lat pull   40 lb  3 x 8 reps 43 lb  3 x 8 reps    UBE 3/3 fwd/bkwd level 3       DB OH Press  7 lb  3 x 8 reps 6 lb  3 x 8 reps     Rows   30 lb  3 x 8 reps 33 lb  3 x 8 reps 20 lb DB, bench  3 x8 reps   Serratus initiation  Red band, 3lb  3 x 8reps Red band, 4lb  3 x 5 reps Red band  4 lb  3 x 8 reps Red band  5 lb  3 x 8 reps   Ys Yellow  3 x 8 reps  standing       Bent over Row  10 lb  3 x 8 reps  Left/right      Kumar Carry      20 lb  3 laps    Towel Stretch 10 x 10 sec       Treadmill  3.0 mph, 63 calories, 10 incline 2.3 mph, 66 calories, 15 incline 8 min  66 calories 63 calories incline 12.5 8 min   Scap [place]  Yellow  3 x 8 reps  90/90  With OH press  3 x 8 reps    Box Squat   2 x 8 reps with band OH 20 lb  17 in  12 x Squat with OH pull aprat yellow  12x   Self thoracic spinal extension    Chair  12 x Chair  12 x         THERAPEUTIC ACTIVITY: ( 25 minutes): Activities per gid below to improve functional movement related mobility, strength and balance to improve neuro-muscular carryover to daily functional activities for improving patient's quality of life. Required visual, verbal and manual cues to promote proper body alignment and promote proper body posture/mechanics. Progressed resistance and complexity of movement as indicated.      Date:  10/27/2021 Date:  10/29/21 Date:  11/3/21 Date:  11/8/21 Date:  11/10/21 Date:  11/15/21 Date:  11/17/21   Activity/Exercise Parameters Parameters Parameters       RDL Instruction with dowel, corrective with band 3 x 5 reps  30 lb 3 x 5 reps  Corrective with dowel and   Yellow t-band  2 x 8 reps  Progressed to 20 ln short bar  2 x 5 reps       Gait activity   Varied arm swing with use of mirror for visual feedback --cues for increased left shoulder ext   10 min total practice         Kumar Carry   15 lb   3 large laps 20 lb  3 large laps   20 lb  3 large laps   Dead lift   20 lb on block  3 x 5 reps  Increased time for instruction and positioning 30 lb x 5 reps  35 lb 2 x 8 reps  45 lb  3 x 8 reps    Squat     instruction in high bar squat, positioning of barbell, safety in weight rack  Dowel only  2 x 8 reps  Instruction in technique and positioning, use of bench to assist with depth  45 lb bar  3 x 8 reps   Beast Walk       30 lb  12x                   MANUAL THERAPY: (0 minutes): Joint mobilization, Soft tissue mobilization was utilized and necessary because of the patient's restricted joint motion and restricted motion of soft tissue mobility. Date  11/17/2021    Technique Used Grade  Level # Time(s) Effect while being performed   Quadrant  IV- Left shoulder  3 bouts  Decrease joint restriction multi-planes    STM/DTM Deep pressue     Left pec and bicep  5 min  Decrease abnormal muscle tone; improve joint mechanics and mobility. MFR/scar mob Skin glide  All directions; left anterior shoulder  5 min Decrease soft tissue restriction to joint movement                                             HEP Log Date   Open book, LTR, wall slide with lift off 10/6/2021   2. Isometric IR/ER, Ws 10/11/2021   3. S/l ER 10/18/2021   4.    5.           [employer] [facility] Portal  Treatment/Session Summary:    Response to Treatment: Pt responds well to use of bench to assist with squat depth and to increase posterior chain recruitment. Pt responds well to thoracic mobilization to improve left shoulder discomfort with barbell positioning for squat. Communication/Consultation:  None today   Equipment provided today: None today   Recommendations/Intent for next treatment session:   Next visit will focus on Core Stability Pain Science Education RTC strengthening soft tissue mobilization. BEGIN Squat next visit       Treatment Plan of Care Effective Dates: 9/27/2021 TO 12/26/2021 (90 days).   Frequency/Duration: 2 times a week for 90 Days       Total Treatment Billable Duration: 40 min, 5 min untimed due to rest  PT Patient Time In/Time Out  Time In: 0930  Time Out: 1015  Jerman Hurtado PT    Future Appointments   Date Time Provider Simran Mckeon   11/18/2021 10:30 AM Michelle Dee MD [facility]   11/22/2021 10:30 AM Mikael Norwood PT [facility]   11/24/2021  9:45 AM Orquidea Jeremías Hardy, PT [facility] AND [facility]

## 2021-11-22 ENCOUNTER — HOSPITAL ENCOUNTER (OUTPATIENT)
Dept: PHYSICAL THERAPY | Age: 36
Discharge: HOME | End: 2021-11-22
Attending: ORTHOPAEDIC SURGERY

## 2021-11-22 NOTE — PROGRESS NOTES
Trini Morales  : 1985  Primary:   Secondary:  [facility]  [address]. [address], [address]  [phone]   [phone]        OUTPATIENT DAILY NOTE    NAME/AGE/GENDER: Trini Morales is a 43 y.o. male. DATE: 2021    Mr. Aishwarya Garduno for today's appointment due to sick .     Murali Awad, PT

## 2021-11-24 ENCOUNTER — HOSPITAL ENCOUNTER (OUTPATIENT)
Dept: PHYSICAL THERAPY | Age: 36
Discharge: HOME | End: 2021-11-24
Attending: ORTHOPAEDIC SURGERY

## 2021-11-24 PROCEDURE — 97530 THERAPEUTIC ACTIVITIES: CPT

## 2021-11-24 PROCEDURE — 97110 THERAPEUTIC EXERCISES: CPT

## 2021-11-24 NOTE — PROGRESS NOTES
Madhu Rodriguez  : 1985  Payor: /  Joleen Gutierres at [address] [place] [address] [address], [place], [address]  [phone]   [phone]        OUTPATIENT PHYSICAL THERAPY: Daily Treatment Note 2021 Visit Count:  15    Treatment Diagnosis: Pain in Left Shoulder (M25.512)  Stiffness of Left Shoulder not elsewhere specified (M25.612)  Impingement syndrome of Left Shoulder (M75.42)  Muscle Weakness, Generalized (M62.81)  Encounter for other orthopedic aftercare (Z47.89)    Precautions/Allergies:   Patient has no known allergies. 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 Required minimal visual, verbal and manual cues to promote proper body alignment and promote proper body posture. Progressed resistance and complexity of movement as indicated.      Date:  11/3/21 Date:  21 Date:  11/10/21 Date:  11/15/21 Date:  21 Date:  21   Activity/Exercise         Education Assessment of goals, ROM, and QuickDash        Rings 5 min in all planes        Open book    1/2 kneeling  12x  Left/right 1/2 kneeling  12x  Left/right 1/2 kneeling  12x  Left/right   Face pull aparts Yellow  3 x 8 reps  Yellow  3 x 8 reps      Lat pull   40 lb  3 x 8 reps 43 lb  3 x 8 reps  43 lb  3 x 8 reps   UBE 3/3 fwd/bkwd level 3        DB OH Press  7 lb  3 x 8 reps 6 lb  3 x 8 reps      Rows   30 lb  3 x 8 reps 33 lb  3 x 8 reps 20 lb DB, bench  3 x8 reps 40 lb machine  3 x 8 reps   Serratus initiation  Red band, 3lb  3 x 8reps Red band, 4lb  3 x 5 reps Red band  4 lb  3 x 8 reps Red band  5 lb  3 x 8 reps    Ys Yellow  3 x 8 reps  standing        Bent over Row  10 lb  3 x 8 reps  Left/right       Kumar Carry      20 lb  3 laps     Towel Stretch 10 x 10 sec        Treadmill  3.0 mph, 63 calories, 10 incline 2.3 mph, 66 calories, 15 incline 8 min  66 calories 63 calories incline 12.5 8 min 66 calories incline 12.5 8 min   Scap [place]  Yellow  3 x 8 reps  90/90  With OH press  3 x 8 reps  90/90  With OH press  3 x 8 reps   Box Squat   2 x 8 reps with band OH 20 lb  17 in  12 x Squat with OH pull aprat yellow  12x    Self thoracic spinal extension    Chair  12 x Chair  12 x 10x   [place] Press      Red band  2 x 10 reps  Left/right         THERAPEUTIC ACTIVITY: ( 15 minutes): Activities per gid below to improve functional movement related mobility, strength and balance to improve neuro-muscular carryover to daily functional activities for improving patient's quality of life. Required visual, verbal and manual cues to promote proper body alignment and promote proper body posture/mechanics. Progressed resistance and complexity of movement as indicated.      Date:  10/27/2021 Date:  10/29/21 Date:  11/3/21 Date:  11/8/21 Date:  11/10/21 Date:  11/15/21 Date:  11/17/21 Date:  11/24/21   Activity/Exercise Parameters Parameters Parameters        RDL Instruction with dowel, corrective with band 3 x 5 reps  30 lb 3 x 5 reps  Corrective with dowel and   Yellow t-band  2 x 8 reps  Progressed to 20 ln short bar  2 x 5 reps        Gait activity   Varied arm swing with use of mirror for visual feedback --cues for increased left shoulder ext   10 min total practice          Kumar Carry   15 lb   3 large laps 20 lb  3 large laps   20 lb  3 large laps 20 lb  3 large laps   Dead lift   20 lb on block  3 x 5 reps  Increased time for instruction and positioning 30 lb x 5 reps  35 lb 2 x 8 reps  45 lb  3 x 8 reps     Squat     instruction in high bar squat, positioning of barbell, safety in weight rack  Dowel only  2 x 8 reps  Instruction in technique and positioning, use of bench to assist with depth  45 lb bar  3 x 8 reps    Beast Walk       30 lb  12x 33 lb  15x   OH Press        Instruction with dowel x 10 reps  20 lb  3 x 8 reps         MANUAL THERAPY: (0 minutes): Joint mobilization, Soft tissue mobilization was utilized and necessary because of the patient's restricted joint motion and restricted motion of soft tissue mobility. Date  11/24/2021    Technique Used Grade  Level # Time(s) Effect while being performed   Quadrant  IV- Left shoulder  3 bouts  Decrease joint restriction multi-planes    STM/DTM Deep pressue     Left pec and bicep  5 min  Decrease abnormal muscle tone; improve joint mechanics and mobility. MFR/scar mob Skin glide  All directions; left anterior shoulder  5 min Decrease soft tissue restriction to joint movement                                             HEP Log Date   Open book, LTR, wall slide with lift off 10/6/2021   2. Isometric IR/ER, Ws 10/11/2021   3. S/l ER 10/18/2021   4.    5.           MedBridge Portal  Treatment/Session Summary:    Response to Treatment: Pt able to complete OH press today with no shoulder pain and good control. Pt to continue with RTC and scapular strengthening. Initiate pushing task next visits. Communication/Consultation:  None today   Equipment provided today: None today   Recommendations/Intent for next treatment session:   Next visit will focus on Core Stability Pain Science Education RTC strengthening soft tissue mobilization. BEGIN Squat next visit       Treatment Plan of Care Effective Dates: 9/27/2021 TO 12/26/2021 (90 days).   Frequency/Duration: 2 times a week for 90 Days       Total Treatment Billable Duration: 40 min, 5 min untimed due to rest  PT Patient Time In/Time Out  Time In: 0940  Time Out: [address] Brian Griffith, PT    Future Appointments   Date Time Provider Simran Mckeon   11/24/2021  9:45 AM Trav [facility]aptiste [facility]   1/4/2022  2:00 PM MD SHRUTI Rivera

## 2021-12-01 NOTE — THERAPY DISCHARGE
Wilfredo Esposito  : 1985  Payor: /  [id number]  at [facility]  [address], [address]  [phone]   [phone]         OUTPATIENT PHYSICAL THERAPY:Discontinuation Summary    ICD-10: Treatment Diagnosis: left shoulder pain M25.512, left shoulder stiffness  Precautions/Allergies:   Patient has no known allergies. TREATMENT PLAN:  Effective Dates: 2021 TO 2021 (60 days). Frequency/Duration: 2 times a week for 60 Day(s) and upon reassessment, will adjust frequency and duration as progress indicates. MEDICAL/REFERRING DIAGNOSIS:  Pain in left shoulder [M25.512]   DATE OF ONSET: fall on 2021  REFERRING PHYSICIAN: Nichole Perry MD MD Orders: Brenda Garcia and treat  Return MD Appointment: 3/4/2021     INITIAL ASSESSMENT:  Mr. Memo Burns presents with increased left shoulder pain after tripping and falling on entryway leading into his apartment building. Accident occurred on 2021 and pt reported feeling increased pain roughly 2 days later with loss of motion and strength. Pt seen in ER and was placed in sling. X-rays were negative. Pt continued to have pain and he saw ortho. Further x-ray and MRI revealed small fracture. Pt presents today with loss of motion and strength and inability to use the arm for ADL's and for work tasks like reaching, lifting, pushing and pulling. Pt will benefit from skilled therapy to address his weakness as well as loss of motion and allow him to return to all prior activities. Assessment as of 2021:  Pt has attended therapy for ROM and strengthening of the left shoulder. He is reporting mild pain with elevation of the arm. He has noted improvement, but the pain is still present. He has progressed AROM to the following:  Flex 146 °, abd 147 °, ER 50 °, IR 58 °. Strength as follows: flex 4/5 with pain, abd 4/5 with pain, ER 4/5 with pain and IR 4+/5 with pain.   We are working on overall shoulder strength in various muscle groups and ROM to achieve full overhead shoulder motion. Assessment as of 5/4/2021:  Pt returning to therapy one time per week for continued strengthening. He has reported some mild soreness and pain noted at endrange motions and with placing hand behind head. Strength as follows:  Flex 4/5, abd 4/5, ER 4/5, IR 4+/5. Pt has full active motion. We are focusing on return to overhead work as pt is in this position at work. He will be following up with ortho in another month. Discontinuation Assessment 12/1/2021:  Carmen Basurto was seen in physical therapy from 3/2/2021 to 5/26/2021 for 13 visits. Treatment has been discontinued at this time due to patient failing to return for additional treatment. The below goals were met prior to discontinuation. Upon further chart review, pt was seen again by margret JACINTO, as his current provider, changed hospital systems,  and underwent surgical procedure and is now in therapy again at another location. Thank you for this referral.        PROBLEM LIST (Impacting functional limitations):  1. Decreased Strength  2. Decreased ADL/Functional Activities  3. Increased Pain  4. Decreased Flexibility/Joint Mobility INTERVENTIONS PLANNED: (Treatment may consist of any combination of the following)  1. Cold  2. Heat  3. Home Exercise Program (HEP)  4. Manual Therapy  5. Range of Motion (ROM)  6. Therapeutic Exercise/Strengthening     GOALS: (Goals have been discussed and agreed upon with patient.)  Short-Term Functional Goals: Time Frame: 4 weeks  1. Establish independent HEP with no cuing.-met  2. Pt will be able to gain 10 degrees of active motion for overhead reaching.-met  3. Pt will be able to report ability to sleep on either side.-met  4. Pt will be able to gain 1/2 grade increase in strength required for lifting 10 plus pound objects.-met  Discharge Goals: Time Frame: 8 weeks (60 days)  1.  Pt will be able to improve score on [place] by at least 5 points for advanced ADL's.-met  2. Pt will be able to report ability to lift and carry at least 30 pounds.-in progress  3. Pt will be able to restore motion to [facility] UE for all reaching.-in progress  4. Pt will be able to report ability to work overhead for up to 1-2 minutes.-in progress    OUTCOME MEASURE:   Tool Used: Dmitri  Score:  Initial: 31/100 Most Recent: 61 (Date: 4/1/2021 )   Interpretation of Score:   Interpretation of Score: 20 questions each scored on a 3 point scale with 0 representing \"extreme difficulty or unable to perform\" and 3 representing \"no difficulty\", 3 questions each scored from 0-10 about pain, and 1 question scored 0-10 about function of the shoulder  The lower the score, the greater the functional disability. 100/100 represents no disability, pain or loss of function.  Minimal clinically important difference is 11.4. Minimal detectable change is 12.1    Outcome Measure Conversion Site        Rehabilitation Potential For Stated Goals: Good  Regarding Harpreet Zhu's therapy, I certify that the treatment plan above will be carried out by a therapist or under their direction.   Thank you for this referral,  Joseph Vicente, PT

## 2021-12-06 ENCOUNTER — HOSPITAL ENCOUNTER (OUTPATIENT)
Dept: PHYSICAL THERAPY | Age: 36
Discharge: HOME | End: 2021-12-06

## 2021-12-06 PROCEDURE — 97110 THERAPEUTIC EXERCISES: CPT

## 2021-12-06 PROCEDURE — 97530 THERAPEUTIC ACTIVITIES: CPT

## 2021-12-06 NOTE — PROGRESS NOTES
Roma Rivers  : 1985  Payor: /  [id number] at [facility]. [address] [place] [address] [address], [place], [address]  [phone]   [phone]        OUTPATIENT PHYSICAL THERAPY: Daily Treatment Note 2021 Visit Count:  16    Treatment Diagnosis: Pain in Left Shoulder (M25.512)  Stiffness of Left Shoulder not elsewhere specified (M25.612)  Impingement syndrome of Left Shoulder (M75.42)  Muscle Weakness, Generalized (M62.81)  Encounter for other orthopedic aftercare (Z47.89)    Precautions/Allergies:   Patient has no known allergies. 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 Pain: Initial: does not rate/10  Medications Last Reviewed:  2021     tSession: does not rate/10     Updated Objective Findings: None today      TREATMENT:   THERAPEUTIC EXERCISE: ( 25 minutes):  Exercises per grid below to improve mobility, strength and balance. Required minimal visual, verbal and manual cues to promote proper body alignment and promote proper body posture. Progressed resistance and complexity of movement as indicated.      Date:  11/3/21 Date:  21 Date:  11/10/21 Date:  11/15/21 Date:  21 Date:  21 Date:  21   Activity/Exercise          Education Assessment of goals, ROM, and QuickDash         Rings 5 min in all planes         Open book    1/2 kneeling  12x  Left/right 1/2 kneeling  12x  Left/right 1/2 kneeling  12x  Left/right 1/2 kneeling  12x  Left/right   Face pull aparts Yellow  3 x 8 reps  Yellow  3 x 8 reps       Lat pull   40 lb  3 x 8 reps 43 lb  3 x 8 reps  43 lb  3 x 8 reps 47 lb  3 x 8 reps   UBE 3/3 fwd/bkwd level 3 DB OH Press  7 lb  3 x 8 reps 6 lb  3 x 8 reps       Rows   30 lb  3 x 8 reps 33 lb  3 x 8 reps 20 lb DB, bench  3 x8 reps 40 lb machine  3 x 8 reps 40 lb machine  3 x 8 reps   Serratus initiation  Red band, 3lb  3 x 8reps Red band, 4lb  3 x 5 reps Red band  4 lb  3 x 8 reps Red band  5 lb  3 x 8 reps  Red band  3 lb  3 x 8 reps   Ys Yellow  3 x 8 reps  standing         Bent over Row  10 lb  3 x 8 reps  Left/right        Kumar Carry      20 lb  3 laps      Towel Stretch 10 x 10 sec         Treadmill  3.0 mph, 63 calories, 10 incline 2.3 mph, 66 calories, 15 incline 8 min  66 calories 63 calories incline 12.5 8 min 66 calories incline 12.5 8 min 45 calories incline 5 8 min   Scap [place]  Yellow  3 x 8 reps  90/90  With OH press  3 x 8 reps  90/90  With OH press  3 x 8 reps 90/90 , Red With OH press  3 x 8 reps   Box Squat   2 x 8 reps with band OH 20 lb  17 in  12 x Squat with OH pull aprat yellow  12x     Self thoracic spinal extension    Chair  12 x Chair  12 x 10x    Pallof Press      Red band  2 x 10 reps  Left/right          THERAPEUTIC ACTIVITY: ( 15 minutes): Activities per gid below to improve functional movement related mobility, strength and balance to improve neuro-muscular carryover to daily functional activities for improving patient's quality of life. Required visual, verbal and manual cues to promote proper body alignment and promote proper body posture/mechanics. Progressed resistance and complexity of movement as indicated.      Date:  10/27/2021 Date:  10/29/21 Date:  11/3/21 Date:  11/8/21 Date:  11/10/21 Date:  11/15/21 Date:  11/17/21 Date:  11/24/21 Date:  12/6/21   Activity/Exercise Parameters Parameters Parameters         RDL Instruction with dowel, corrective with band 3 x 5 reps  30 lb 3 x 5 reps  Corrective with dowel and   Yellow t-band  2 x 8 reps  Progressed to 20 ln short bar  2 x 5 reps         Gait activity   Varied arm swing with use of mirror for visual feedback --cues for increased left shoulder ext   10 min total practice           Kumar Carry   15 lb   3 large laps 20 lb  3 large laps   20 lb  3 large laps 20 lb  3 large laps 20 lb   3 large laps   Dead lift   20 lb on block  3 x 5 reps  Increased time for instruction and positioning 30 lb x 5 reps  35 lb 2 x 8 reps  45 lb  3 x 8 reps   45 lb  X 5 reps  55 lb   3 x 5 reps   Squat     instruction in high bar squat, positioning of barbell, safety in weight rack  Dowel only  2 x 8 reps  Instruction in technique and positioning, use of bench to assist with depth  45 lb bar  3 x 8 reps     Beast Walk       30 lb  12x 33 lb  15x 37 lb  15 x   OH Press        Instruction with dowel x 10 reps  20 lb  3 x 8 reps DB 15 lb   3 x 5 reps         MANUAL THERAPY: (0 minutes): Joint mobilization, Soft tissue mobilization was utilized and necessary because of the patient's restricted joint motion and restricted motion of soft tissue mobility. Date  12/6/2021    Technique Used Grade  Level # Time(s) Effect while being performed   Quadrant  IV- Left shoulder  3 bouts  Decrease joint restriction multi-planes    STM/DTM Deep pressue     Left pec and bicep  5 min  Decrease abnormal muscle tone; improve joint mechanics and mobility. MFR/scar mob Skin glide  All directions; left anterior shoulder  5 min Decrease soft tissue restriction to joint movement                                             HEP Log Date   Open book, LTR, wall slide with lift off 10/6/2021   2. Isometric IR/ER, Ws 10/11/2021   3. S/l ER 10/18/2021   4.    5.           MedBridge Portal  Treatment/Session Summary:    Response to Treatment: Pt continues to  Be on a progressive strengthening track. Pt requires less cues for deadlift positioning with largest barrier maintaining thoracic extensor control.     Communication/Consultation:  None today   Equipment provided today: None today   Recommendations/Intent for next treatment session:   Next visit will focus on Core Stability Pain Science Education RTC strengthening soft tissue mobilization. BEGIN Squat next visit       Treatment Plan of Care Effective Dates: 9/27/2021 TO 12/26/2021 (90 days).   Frequency/Duration: 2 times a week for 90 Days       Total Treatment Billable Duration: 40 min, 5 min untimed due to rest  PT Patient Time In/Time Out  Time In: 1530  Time Out: Joy 22, PT    Future Appointments   Date Time Provider Simran Mckeon   12/8/2021 10:30 AM [place] Ket, PT [facility] [place] [facility]   12/13/2021 10:30 AM [place] Ket, PT [facility]   12/15/2021 10:30 AM Kevyn Ket, PT [facility]   12/20/2021  9:45 AM [place] Ket, PT [facility]   12/22/2021  9:45 AM [place] Ket, PT [facility]   12/28/2021  9:45 AM Kevyn Ket, PT [facility]   12/30/2021  9:45 AM [place] Ket, PT [facility]   1/3/2022  9:45 AM Kevyn Ket, PT [facility]   1/4/2022  2:00 PM MD SHRUTI Duran   1/5/2022  9:45 AM [place] Ket, PT [facility]   1/10/2022  9:45 AM Kevyn Ket, PT [facility]   1/12/2022  9:45 AM Kevyn Ket, PT [facility]

## 2021-12-08 ENCOUNTER — HOSPITAL ENCOUNTER (OUTPATIENT)
Dept: PHYSICAL THERAPY | Age: 36
Discharge: HOME | End: 2021-12-08

## 2021-12-08 PROCEDURE — 97110 THERAPEUTIC EXERCISES: CPT

## 2021-12-08 PROCEDURE — 97530 THERAPEUTIC ACTIVITIES: CPT

## 2021-12-08 NOTE — PROGRESS NOTES
Wilver Craig  : 1985  Payor: /  [id number] at [address] [place] [address] [address], [place], [address]  [phone]   [phone]        OUTPATIENT PHYSICAL THERAPY: Daily Treatment Note 2021 Visit Count:  17    Treatment Diagnosis: Pain in Left Shoulder (M25.512)  Stiffness of Left Shoulder not elsewhere specified (M25.612)  Impingement syndrome of Left Shoulder (M75.42)  Muscle Weakness, Generalized (M62.81)  Encounter for other orthopedic aftercare (Z47.89)    Precautions/Allergies:   Patient has no known allergies. 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 Pain: Initial: does not rate/10  Medications Last Reviewed:  2021     tSession: does not rate/10     Updated Objective Findings: None today      TREATMENT:   THERAPEUTIC EXERCISE: ( 25 minutes):  Exercises per grid below to improve mobility, strength and balance. Required minimal visual, verbal and manual cues to promote proper body alignment and promote proper body posture. Progressed resistance and complexity of movement as indicated.      Date:  11/15/21 Date:  21 Date:  21 Date:  21 Date:  21   Activity/Exercise        Education        Eccentric lat     2.5 lb  5 min with progression toward LTR   Open book 1/2 kneeling  12x  Left/right 1/2 kneeling  12x  Left/right 1/2 kneeling  12x  Left/right 1/2 kneeling  12x  Left/right 1/2 kneeling  12x  Left/right  Shoulder mobility drill  2 min left/right   Lat pull 43 lb  3 x 8 reps  43 lb  3 x 8 reps 47 lb  3 x 8 reps Standing  40 lb  3 x 8 reps   Supine shoulder elevation ,<> depression     Dowel  3 x 5 reps   Rows 33 lb  3 x 8 reps 20 lb DB, bench  3 x8 reps 40 lb machine  3 x 8 reps 40 lb machine  3 x 8 reps    Serratus initiation Red band  4 lb  3 x 8 reps Red band  5 lb  3 x 8 reps  Red band  3 lb  3 x 8 reps    Kumar Carry 20 lb  3 laps       Treadmill 8 min  66 calories 63 calories incline 12.5 8 min 66 calories incline 12.5 8 min 45 calories incline 5 8 min 44 calories incline 5 8 min   Scap [place] 90/90  With OH press  3 x 8 reps  90/90  With OH press  3 x 8 reps 90/90 , Red With OH press  3 x 8 reps    Box Squat 20 lb  17 in  12 x Squat with OH pull aprat yellow  12x      Self thoracic spinal extension Chair  12 x Chair  12 x 10x     Pallof Press   Red band  2 x 10 reps  Left/right     Push up     38 in  15 x         THERAPEUTIC ACTIVITY: ( 20 minutes): Activities per gid below to improve functional movement related mobility, strength and balance to improve neuro-muscular carryover to daily functional activities for improving patient's quality of life. Required visual, verbal and manual cues to promote proper body alignment and promote proper body posture/mechanics. Progressed resistance and complexity of movement as indicated. Date:  11/15/21 Date:  11/17/21 Date:  11/24/21 Date:  12/6/21 Date:  12/8/21   Activity/Exercise        Kumar Carry  20 lb  3 large laps 20 lb  3 large laps 20 lb   3 large laps 25 lb  3 large laps   Dead lift 45 lb  3 x 8 reps   45 lb  X 5 reps  55 lb   3 x 5 reps    Squat  Instruction in technique and positioning, use of bench to assist with depth  45 lb bar  3 x 8 reps      Beast Walk  30 lb  12x 33 lb  15x 37 lb  15 x 37 lb  X 15   OH Press   Instruction with dowel x 10 reps  20 lb  3 x 8 reps DB 15 lb   3 x 5 reps 25 lb x 5 reps  30 lb  3 x 5 reps         MANUAL THERAPY: (0 minutes): Joint mobilization, Soft tissue mobilization was utilized and necessary because of the patient's restricted joint motion and restricted motion of soft tissue mobility.         Date  12/8/2021    Technique Used Grade  Level # Time(s) Effect while being performed   Quadrant  IV- Left shoulder  3 bouts  Decrease joint restriction multi-planes    STM/DTM Deep pressue     Left pec and bicep  5 min  Decrease abnormal muscle tone; improve joint mechanics and mobility. MFR/scar mob Skin glide  All directions; left anterior shoulder  5 min Decrease soft tissue restriction to joint movement                                             HEP Log Date   Open book, LTR, wall slide with lift off 10/6/2021   2. Isometric IR/ER, Ws 10/11/2021   3. S/l ER 10/18/2021   4.    5.           MedBridge Portal  Treatment/Session Summary:    Response to Treatment: Pt responds well eccentric lat with addition of lower trunk rotation stretch. Pt demos normalized and symmetrical range with overhead press and and good upper trap recruitment at end range. Pt progressed today with standing lat pull and increased weight with farmer carry. Communication/Consultation:  None today   Equipment provided today: None today   Recommendations/Intent for next treatment session:   Next visit will focus on Core Stability Pain Science Education RTC strengthening soft tissue mobilization. BEGIN Squat next visit       Treatment Plan of Care Effective Dates: 9/27/2021 TO 12/26/2021 (90 days).   Frequency/Duration: 2 times a week for 90 Days       Total Treatment Billable Duration: 40 min, 5 min untimed due to rest  PT Patient Time In/Time Out  Time In: 1025  Time Out: Rainer Bruce, PT    Future Appointments   Date Time Provider Simran Mckeon   12/13/2021 10:30 AM Mello Cuba, PT [facility]   12/15/2021 10:30 AM Mello Cuba, PT [facility]   12/20/2021  9:45 AM Mello Cuba, PT [facility]   12/22/2021  9:45 AM Mello Cuba, PT [facility]   12/28/2021  9:45 AM Mello Cuba, PT [facility]   12/30/2021  9:45 AM Mello Cuba, PT [facility]   1/3/2022  9:45 AM [facility]ello Cuba, [facility]   1/4/2022  2:00 PM Fortunato Felix MD [facility]   1/5/2022  9:45 AM Nikolas Ward, PT [facility]   1/10/2022  9:45 AM Nikolas Ward[facility]   1/12/2022  9:45 AM [facility]

## 2021-12-13 ENCOUNTER — HOSPITAL ENCOUNTER (OUTPATIENT)
Dept: PHYSICAL THERAPY | Age: 36
Discharge: HOME | End: 2021-12-13

## 2021-12-13 NOTE — PROGRESS NOTES
Tao Norton  : 1985  Primary:   Secondary:  Therapy Center at [address], [address]  [phone]   [phone]        OUTPATIENT DAILY NOTE    NAME/AGE/GENDER: Tao Norton is a 43 y.o. male. DATE: 2021    Mr. Jaime Prabhakar for today's appointment.     Jonah Andrade, PT

## 2021-12-14 ENCOUNTER — HOSPITAL ENCOUNTER (OUTPATIENT)
Dept: PHYSICAL THERAPY | Age: 36
Discharge: HOME | End: 2021-12-14

## 2021-12-14 PROCEDURE — 97110 THERAPEUTIC EXERCISES: CPT

## 2021-12-14 NOTE — PROGRESS NOTES
Nato Fishman  : 1985  Payor: /  Guido Kemp at [address] [place] [address] [address], [place], [address]  [phone]   [phone]        OUTPATIENT PHYSICAL THERAPY: Daily Treatment Note 2021 Visit Count:  18    Treatment Diagnosis: Pain in Left Shoulder (M25.512)  Stiffness of Left Shoulder not elsewhere specified (M25.612)  Impingement syndrome of Left Shoulder (M75.42)  Muscle Weakness, Generalized (M62.81)  Encounter for other orthopedic aftercare (Z47.89)    Precautions/Allergies:   Patient has no known allergies. MD Orders: evaluate and treat  MEDICAL/REFERRING DIAGNOSIS:  Surgery follow-up [Z09]  Tendinopathy of left biceps [M67.783]   DATE OF ONSET: 21  REFERRING PHYSICIAN: Rui Cain MD  RETURN PHYSICIAN APPOINTMENT: 2022       Pre-treatment Symptoms/Complaints: Pt reports shoulder is getting better overall, just a little tight today   Pain: Initial: does not rate/10  Medications Last Reviewed:  2021     tSession: does not rate/10     Updated Objective Findings: None today      TREATMENT:   THERAPEUTIC EXERCISE: ( 25 minutes):  Exercises per grid below to improve mobility, strength and balance. Required minimal visual, verbal and manual cues to promote proper body alignment and promote proper body posture. Progressed resistance and complexity of movement as indicated.      Date:  11/15/21 Date:  21 Date:  21 Date:  21 Date:  21 Date:  21   Activity/Exercise         Education         Eccentric lat     2.5 lb  5 min with progression toward LTR    Open book 1/2 kneeling  12x  Left/right 1/2 kneeling  12x  Left/right 1/2 kneeling  12x  Left/right 1/2 kneeling  12x  Left/right 1/2 kneeling  12x  Left/right  Shoulder mobility drill  2 min left/right    Lat pull 43 lb  3 x 8 reps  43 lb  3 x 8 reps 47 lb  3 x 8 reps Standing  40 lb  3 x 8 reps Standing, 43 lb  3 x 8 reps   Supine shoulder elevation ,<> depression     Dowel  3 x 5 reps    Rows 33 lb  3 x 8 reps 20 lb DB, bench  3 x8 reps 40 lb machine  3 x 8 reps 40 lb machine  3 x 8 reps  DB,   20 lb  3 x 8 reps     Serratus initiation Red band  4 lb  3 x 8 reps Red band  5 lb  3 x 8 reps  Red band  3 lb  3 x 8 reps  Red Band  3 lb  3 x 8 reps   Kumar Carry 20 lb  3 laps     20 lb  3 laps   Treadmill 8 min  66 calories 63 calories incline 12.5 8 min 66 calories incline 12.5 8 min 45 calories incline 5 8 min 44 calories incline 5 8 min 2 mph, incline 5, 6 min,    Scap [place] 90/90  With OH press  3 x 8 reps  90/90  With OH press  3 x 8 reps 90/90 , Red With OH press  3 x 8 reps  Red band  3 x 5 reps   Box Squat 20 lb  17 in  12 x Squat with OH pull aprat yellow  12x       Self thoracic spinal extension Chair  12 x Chair  12 x 10x      Pallof Press   Red band  2 x 10 reps  Left/right      Push up     38 in  15 x 17 in  3 x 5 reps         THERAPEUTIC ACTIVITY: ( 0 minutes): Activities per gid below to improve functional movement related mobility, strength and balance to improve neuro-muscular carryover to daily functional activities for improving patient's quality of life. Required visual, verbal and manual cues to promote proper body alignment and promote proper body posture/mechanics. Progressed resistance and complexity of movement as indicated.      Date:  11/15/21 Date:  11/17/21 Date:  11/24/21 Date:  12/6/21 Date:  12/8/21 Date:  12/14/21   Activity/Exercise         Kumar Carry  20 lb  3 large laps 20 lb  3 large laps 20 lb   3 large laps 25 lb  3 large laps    Dead lift 45 lb  3 x 8 reps   45 lb  X 5 reps  55 lb   3 x 5 reps     Squat  Instruction in technique and positioning, use of bench to assist with depth  45 lb bar  3 x 8 reps       Beast Walk  30 lb  12x 33 lb  15x 37 lb  15 x 37 lb  X 15    OH Press   Instruction with dowel x 10 reps  20 lb  3 x 8 reps DB 15 lb   3 x 5 reps 25 lb x 5 reps  30 lb  3 x 5 reps          MANUAL THERAPY: (0 minutes): Joint mobilization, Soft tissue mobilization was utilized and necessary because of the patient's restricted joint motion and restricted motion of soft tissue mobility. Date  12/14/2021    Technique Used Grade  Level # Time(s) Effect while being performed   Quadrant  IV- Left shoulder  3 bouts  Decrease joint restriction multi-planes    STM/DTM Deep pressue     Left pec and bicep  5 min  Decrease abnormal muscle tone; improve joint mechanics and mobility. MFR/scar mob Skin glide  All directions; left anterior shoulder  5 min Decrease soft tissue restriction to joint movement                                             HEP Log Date   Open book, LTR, wall slide with lift off 10/6/2021   2. Isometric IR/ER, Ws 10/11/2021   3. S/l ER 10/18/2021   4.    5.           [employer] Portal  Treatment/Session Summary:    Response to Treatment: Pt to continue on progressive strengthening program and return to dead lifting next session to complete requirements for work related duties. Pt demos full and symmetrical overhead ranges with serratus activation and scap angels continue with OH press next visit. Communication/Consultation:  None today   Equipment provided today: None today   Recommendations/Intent for next treatment session:   Next visit will focus on Core Stability Pain Science Education RTC strengthening soft tissue mobilization. BEGIN Squat next visit       Treatment Plan of Care Effective Dates: 9/27/2021 TO 12/26/2021 (90 days).   Frequency/Duration: 2 times a week for 90 Days       Total Treatment Billable Duration: 25 min 5 min un-timed due to rest  PT Patient Time In/Time Out  Time In: 1045  Time Out: 126 Delma Zamora PT    Future Appointments   Date Time Provider Simran Mckeon   12/15/2021 10:30 AM Megan Cote PT [facility]   12/20/2021  9:45 AM Megan Cote PT [facility]   12/22/2021  9:45 AM Megan Cote PT [facility]   12/28/2021  9:45 AM [facility]   12/30/2021  9:45 AM Mikael Starcher, PT [facility]   1/3/2022  9:45 AM Mikael Starcher, PT [facility]   1/4/2022  2:00 PM Michelle Dee MD [facility]   1/5/2022  9:45 AM Mikael Starcher, PT [facility]   1/10/2022  9:45 AM Mikael Starcher, PT [facility]   1/12/2022  9:45 AM [facility]

## 2021-12-15 ENCOUNTER — HOSPITAL ENCOUNTER (OUTPATIENT)
Dept: PHYSICAL THERAPY | Age: 36
Discharge: HOME | End: 2021-12-15

## 2021-12-15 PROCEDURE — 97530 THERAPEUTIC ACTIVITIES: CPT

## 2021-12-15 PROCEDURE — 97110 THERAPEUTIC EXERCISES: CPT

## 2021-12-15 NOTE — PROGRESS NOTES
Kevyn Hackett  : 1985  Payor: /  Clarissa Ogden at [address] [place] [address] [address], [place], [address]  [phone]   [phone]        OUTPATIENT PHYSICAL THERAPY: Daily Treatment Note 12/15/2021 Visit Count:  19    Treatment Diagnosis: Pain in Left Shoulder (M25.512)  Stiffness of Left Shoulder not elsewhere specified (M25.612)  Impingement syndrome of Left Shoulder (M75.42)  Muscle Weakness, Generalized (M62.81)  Encounter for other orthopedic aftercare (Z47.89)    Precautions/Allergies:   Patient has no known allergies. 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 Pain: Initial: does not rate/10  Medications Last Reviewed:  12/15/2021     tSession: does not rate/10     Updated Objective Findings: None today      TREATMENT:   THERAPEUTIC EXERCISE: ( 15 minutes):  Exercises per grid below to improve mobility, strength and balance. Required minimal visual, verbal and manual cues to promote proper body alignment and promote proper body posture. Progressed resistance and complexity of movement as indicated.      Date:  11/15/21 Date:  21 Date:  21 Date:  21 Date:  21 Date:  21 Date:  12/15/21   Activity/Exercise          Education          Eccentric lat     2.5 lb  5 min with progression toward LTR     Open book 1/2 kneeling  12x  Left/right 1/2 kneeling  12x  Left/right 1/2 kneeling  12x  Left/right 1/2 kneeling  12x  Left/right 1/2 kneeling  12x  Left/right  Shoulder mobility drill  2 min left/right     Lat pull 43 lb  3 x 8 reps  43 lb  3 x 8 reps 47 lb  3 x 8 reps Standing  40 lb  3 x 8 reps Standing, 43 lb  3 x 8 reps    Supine shoulder elevation ,<> depression     Dowel  3 x 5 reps     Rows 33 lb  3 x 8 reps 20 lb DB, bench  3 x8 reps 40 lb machine  3 x 8 reps 40 lb machine  3 x 8 reps  DB,   20 lb  3 x 8 reps      Serratus initiation Red band  4 lb  3 x 8 reps Red band  5 lb  3 x 8 reps  Red band  3 lb  3 x 8 reps  Red Band  3 lb  3 x 8 reps    Kumar Carry 20 lb  3 laps     20 lb  3 laps    Treadmill 8 min  66 calories 63 calories incline 12.5 8 min 66 calories incline 12.5 8 min 45 calories incline 5 8 min 44 calories incline 5 8 min 2 mph, incline 5, 6 min,  44 calories incline 5, 8 min   Scap [place] 90/90  With OH press  3 x 8 reps  90/90  With OH press  3 x 8 reps 90/90 , Red With OH press  3 x 8 reps  Red band  3 x 5 reps    Box Squat 20 lb  17 in  12 x Squat with OH pull aprat yellow  12x        Self thoracic spinal extension Chair  12 x Chair  12 x 10x       Pallof Press   Red band  2 x 10 reps  Left/right       Push up     38 in  15 x 17 in  3 x 5 reps 9   RDL Corrective       Red band, dowel  2 x 10 reps         THERAPEUTIC ACTIVITY: ( 25 minutes): Activities per gid below to improve functional movement related mobility, strength and balance to improve neuro-muscular carryover to daily functional activities for improving patient's quality of life. Required visual, verbal and manual cues to promote proper body alignment and promote proper body posture/mechanics. Progressed resistance and complexity of movement as indicated.      Date:  11/15/21 Date:  11/17/21 Date:  11/24/21 Date:  12/6/21 Date:  12/8/21 Date:  12/15/21   Activity/Exercise         Kumar Carry  20 lb  3 large laps 20 lb  3 large laps 20 lb   3 large laps 25 lb  3 large laps 25 lb  3 large laps   Dead lift 45 lb  3 x 8 reps   45 lb  X 5 reps  55 lb   3 x 5 reps  45 lb x 5 reps  55 lb x 3 reps  65 lb x 5 reps  75 lb  3 x 3 reps   Squat  Instruction in technique and positioning, use of bench to assist with depth  45 lb bar  3 x 8 reps       Beast Walk  30 lb  12x 33 lb  15x 37 lb  15 x 37 lb  X 15 40 lb  3 x 5 reps   OH Press   Instruction with dowel x 10 reps  20 lb  3 x 8 reps DB 15 lb   3 x 5 reps 25 lb x 5 reps  30 lb  3 x 5 reps 45 lb  3 x 5 reps         MANUAL THERAPY: (0 minutes): Joint mobilization, Soft tissue mobilization was utilized and necessary because of the patient's restricted joint motion and restricted motion of soft tissue mobility. Date  12/15/2021    Technique Used Grade  Level # Time(s) Effect while being performed   Quadrant  IV- Left shoulder  3 bouts  Decrease joint restriction multi-planes    STM/DTM Deep pressue     Left pec and bicep  5 min  Decrease abnormal muscle tone; improve joint mechanics and mobility. MFR/scar mob Skin glide  All directions; left anterior shoulder  5 min Decrease soft tissue restriction to joint movement                                             HEP Log Date   Open book, LTR, wall slide with lift off 10/6/2021   2. Isometric IR/ER, Ws 10/11/2021   3. S/l ER 10/18/2021   4.    5.           [employer] Interactive Portal  Treatment/Session Summary:    Response to Treatment: Pt responds well to RDL corrective to improve dead lift technique and maintain thoracic extension and lat recruitment. Pt progressing on target with strength and mobility in shoulder. Pt with noted to have decreased stability to with [facility] press and begin specific. Communication/Consultation:  None today   Equipment provided today: None today   Recommendations/Intent for next treatment session:   Next visit will focus on Core Stability Pain Science Education RTC strengthening soft tissue mobilization. BEGIN Squat next visit       Treatment Plan of Care Effective Dates: 9/27/2021 TO 12/26/2021 (90 days).   Frequency/Duration: 2 times a week for 90 Days       Total Treatment Billable Duration: 40 min   PT Patient Time In/Time Out  Time In: 1035  Time Out: 1115  Tammie Jara, PT    Future Appointments   Date Time Provider Simran Mckeon   12/20/2021  9:45 AM Orquidea OLLIE, PT [facility] [place] [facility]   12/22/2021  9:45 AM Tanner Gregory, PT [facility]   12/28/2021  9:45 AM Tannerpeace Gregory, PT [facility]   12/30/2021  9:45 AM Tannerpeace Gregory, PT [facility]   1/3/2022  9:45 AM Tanner Gregory, PT [facility]   1/4/2022  2:00 PM Alyce De Los Santos MD [facility]   1/5/2022  9:45 AM Tannerpeace Gregory, PT [facility]   1/10/2022  9:45 AM Tanner Gregory, PT [facility]   1/12/2022  9:45 AM [place], PT [facility]

## 2021-12-20 ENCOUNTER — HOSPITAL ENCOUNTER (OUTPATIENT)
Dept: PHYSICAL THERAPY | Age: 36
Discharge: HOME | End: 2021-12-20

## 2021-12-20 NOTE — PROGRESS NOTES
Marilyn Turner  : 1985  Primary:   Secondary:  Therapy Center at [facility] 39  [address]. [facility] 82, [address]  [phone]   [phone]        OUTPATIENT DAILY NOTE    NAME/AGE/GENDER: Marilyn Turner is a 43 y.o. male. DATE: 2021     Sherita Sharyn for today's appointment due to sickness.     Westly Apgar, PT

## 2021-12-22 ENCOUNTER — HOSPITAL ENCOUNTER (OUTPATIENT)
Dept: PHYSICAL THERAPY | Age: 36
Discharge: HOME | End: 2021-12-22

## 2021-12-22 NOTE — PROGRESS NOTES
Finn Jacome  : 1985  Primary:   Secondary:  [facility]  [address]. [place] [address], [address]  [phone]   [phone]        OUTPATIENT DAILY NOTE    NAME/AGE/GENDER: Finn Jacome is a 43 y.o. male. DATE: 2021    Mr. Gonzalez Antes for today's appointment due to sickness and awaiting COVID test    Jose Luis Alvarez, PT

## 2021-12-28 ENCOUNTER — HOSPITAL ENCOUNTER (OUTPATIENT)
Dept: PHYSICAL THERAPY | Age: 36
Discharge: HOME | End: 2021-12-28

## 2021-12-28 PROCEDURE — 97530 THERAPEUTIC ACTIVITIES: CPT

## 2021-12-28 PROCEDURE — 97110 THERAPEUTIC EXERCISES: CPT

## 2021-12-28 NOTE — PROGRESS NOTES
Long Morales  : 1985  Payor: /  Albin Parisi at [address] [place] [address] [address], [place], [address]  [phone]   [phone]        OUTPATIENT PHYSICAL THERAPY: Daily Treatment Note 2021 Visit Count:  20    Treatment Diagnosis: Pain in Left Shoulder (M25.512)  Stiffness of Left Shoulder not elsewhere specified (M25.612)  Impingement syndrome of Left Shoulder (M75.42)  Muscle Weakness, Generalized (M62.81)  Encounter for other orthopedic aftercare (Z47.89)    Precautions/Allergies:   Patient has no known allergies. 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 Pain: Initial: does not rate/10  Medications Last Reviewed:  2021     tSession: does not rate/10     Updated Objective Findings: None today      TREATMENT:   THERAPEUTIC EXERCISE: ( 15 minutes):  Exercises per grid below to improve mobility, strength and balance. Required minimal visual, verbal and manual cues to promote proper body alignment and promote proper body posture. Progressed resistance and complexity of movement as indicated.      Date:  11/15/21 Date:  21 Date:  21 Date:  21 Date:  21 Date:  21 Date:  12/15/21 Date:  21   Activity/Exercise           Education        assessment of goals for functional changes   Eccentric lat     2.5 lb  5 min with progression toward LTR      Open book 1/2 kneeling  12x  Left/right 1/2 kneeling  12x  Left/right 1/2 kneeling  12x  Left/right 1/2 kneeling  12x  Left/right 1/2 kneeling  12x  Left/right  Shoulder mobility drill  2 min left/right      Lat pull 43 lb  3 x 8 reps  43 lb  3 x 8 reps 47 lb  3 x 8 reps Standing  40 lb  3 x 8 reps Standing, 43 lb  3 x 8 reps  Standing, 43 lb  3 x 8 reps   Supine shoulder elevation ,<> depression     Dowel  3 x 5 reps      Rows 33 lb  3 x 8 reps 20 lb DB, bench  3 x8 reps 40 lb machine  3 x 8 reps 40 lb machine  3 x 8 reps  DB,   20 lb  3 x 8 reps    DB  15 lb  3 x 8 reps   Serratus initiation Red band  4 lb  3 x 8 reps Red band  5 lb  3 x 8 reps  Red band  3 lb  3 x 8 reps  Red Band  3 lb  3 x 8 reps  Red Band  3 lb  3 x 8 reps   Kumar Carry 20 lb  3 laps     20 lb  3 laps     Treadmill 8 min  66 calories 63 calories incline 12.5 8 min 66 calories incline 12.5 8 min 45 calories incline 5 8 min 44 calories incline 5 8 min 2 mph, incline 5, 6 min,  44 calories incline 5, 8 min 8 min  Incline  4   Scap [place] 90/90  With OH press  3 x 8 reps  90/90  With OH press  3 x 8 reps 90/90 , Red With OH press  3 x 8 reps  Red band  3 x 5 reps  Green band  3 x 5 reps   Box Squat 20 lb  17 in  12 x Squat with OH pull aprat yellow  12x         Self thoracic spinal extension Chair  12 x Chair  12 x 10x        Pallof Press   Red band  2 x 10 reps  Left/right        Push up     38 in  15 x 17 in  3 x 5 reps  17 in  3 x 8 reps   RDL Corrective       Red band, dowel  2 x 10 reps          THERAPEUTIC ACTIVITY: ( 25 minutes): Activities per gid below to improve functional movement related mobility, strength and balance to improve neuro-muscular carryover to daily functional activities for improving patient's quality of life. Required visual, verbal and manual cues to promote proper body alignment and promote proper body posture/mechanics. Progressed resistance and complexity of movement as indicated.      Date:  11/15/21 Date:  11/17/21 Date:  11/24/21 Date:  12/6/21 Date:  12/8/21 Date:  12/15/21 Date:  12/28/21   Activity/Exercise          Kumar Carry  20 lb  3 large laps 20 lb  3 large laps 20 lb   3 large laps 25 lb  3 large laps 25 lb  3 large laps    Dead lift 45 lb  3 x 8 reps   45 lb  X 5 reps  55 lb   3 x 5 reps  45 lb x 5 reps  55 lb x 3 reps  65 lb x 5 reps  75 lb  3 x 3 reps 65 lb x 5 reps  75 lb  3 x 5 reps   Squat  Instruction in technique and positioning, use of bench to assist with depth  45 lb bar  3 x 8 reps        Beast Walk  30 lb  12x 33 lb  15x 37 lb  15 x 37 lb  X 15 40 lb  3 x 5 reps 40 lb  3 x 5 reps   OH Press   Instruction with dowel x 10 reps  20 lb  3 x 8 reps DB 15 lb   3 x 5 reps 25 lb x 5 reps  30 lb  3 x 5 reps 45 lb  3 x 5 reps 45 lb  3 x 5 reps         MANUAL THERAPY: (0 minutes): Joint mobilization, Soft tissue mobilization was utilized and necessary because of the patient's restricted joint motion and restricted motion of soft tissue mobility. Date  12/28/2021    Technique Used Grade  Level # Time(s) Effect while being performed   Quadrant  IV- Left shoulder  3 bouts  Decrease joint restriction multi-planes    STM/DTM Deep pressue     Left pec and bicep  5 min  Decrease abnormal muscle tone; improve joint mechanics and mobility. MFR/scar mob Skin glide  All directions; left anterior shoulder  5 min Decrease soft tissue restriction to joint movement                                             HEP Log Date   Open book, LTR, wall slide with lift off 10/6/2021   2. Isometric IR/ER, Ws 10/11/2021   3. S/l ER 10/18/2021   4.    5.           [employer] Portal  Treatment/Session Summary:    Response to Treatment: Pt making progress toward goal and new goals set to prepare for return to work see re-cert for details. Communication/Consultation:  None today   Equipment provided today: None today   Recommendations/Intent for next treatment session:   Next visit will focus on Core Stability Pain Science Education RTC strengthening soft tissue mobilization. BEGIN Squat next visit       Treatment Plan of Care Effective Dates: 12/28/21 TO 2/26/2022   (60 days).   Frequency/Duration: 2 times a week for 60 Days       Total Treatment Billable Duration: 40 min      Shen Peters, PT    Future Appointments Date Time Provider Simran Mckeon   12/30/2021  9:45 AM Ariane Urena, PT [facility]   1/3/2022  9:45 AM Ariane Urena, PT [facility]   1/4/2022  2:00 PM Michelle Middleton MD [facility]   1/5/2022  9:45 AM Ariane Urena, PT [facility]   1/10/2022  9:45 AM [facility]   1/12/2022  9:45 AM Ariane Urena, PT [facility]

## 2021-12-28 NOTE — THERAPY RECERTIFICATION
Myron Cervantes  : 1985  Primary:   Secondary:  [facility]  [address]. [place], [address]  [phone]   [phone]       Visit Count:  21     OUTPATIENT PHYSICAL THERAPY:Re-certification Assessment 2021   Treatment Diagnosis: Pain in Left Shoulder (M25.512)  Stiffness of Left Shoulder not elsewhere specified (M25.612)  Impingement syndrome of Left Shoulder (M75.42)  Muscle Weakness, Generalized (M62.81)  Encounter for other orthopedic aftercare (Z47.89)    Precautions/Allergies:   Patient has no known allergies. MD Orders: evaluate and treat  MEDICAL/REFERRING DIAGNOSIS:   Tendinopathy of left biceps   DATE OF ONSET: 21  REFERRING PHYSICIAN: Milton Street MD  RETURN PHYSICIAN APPOINTMENT: Oct 14, 5976     Recertification ASSESSMENT:  Mr. Guido Hernandez has met 5 goals with 3 additional goals written to prepare for work related tasks. Pt is making significant improvements as he is able to push 45 lb overhead, lift 75 lb from the floor, carry 40 lb by side side, and pull 40 lbs. 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 Patient will benefit from skilled physical therapy for manual therapeutic techniques (as appropriate), therapeutic exercises and activities, neuromuscular re-education, and comprehensive home exercises program to address current impairments and functional limitations. PROBLEM LIST (Impacting functional limitations):  1. Decreased Strength  2. Decreased ADL/Functional Activities  3. Increased Pain  4. Decreased Activity Tolerance  5. Increased Fatigue  6. Decreased Flexibility/Joint Mobility  7. Edema/Girth  8. Decreased Knowledge of Precautions  9.  Decreased Swain with Home Exercise Program INTERVENTIONS PLANNED: (Treatment may consist of any combination of the following)  1. Balance Exercise  2. Cold  3. Cryotherapy  4. Family Education  5. Heat  6. Home Exercise Program (HEP)  7. Manual Therapy  8. Neuromuscular Re-education/Strengthening  9. Range of Motion (ROM)  10. Therapeutic Activites  11. Therapeutic Exercise/Strengthening  12. Transcutaneous Electrical Nerve Stimulation (TENS)   TREATMENT PLAN:  Effective Dates: 9/27/2021 TO 12/26/2021 (90 days). Frequency/Duration: 2 times a week for 90 Day(s)    GOALS: (Goals have been discussed and agreed upon with patient.)    Goals: 45 days  Goal Met   1. Alpha Pare will be independent with HEP to maintain functional gains made with therapy intervention. 1.  [] Date: progressing, therapist continues to modify   2. Alpha Pare will demonstrate increased shoulder flexion  to 165 degrees without pain to promote active use for overhead reaching. 2.  [x] Date:12/28/21   3. Alpha Pare will demonstrate increased shoulder external rotation to 70 degrees  At 90 dg abduction without pain to promote donning of seat belt. Goal range wrote in error at 70dg at 90 dg abduction - should read 25 dg at 90 dg abduction 3. [] Date: progressing 15 dg   4. Alpha Pare will report sleeping through the night and not waking due to shoulder pain. 4.  [x] Date: 12/28/21   5. Alpha Pare will demonstrate a 20 point increase on the DASH to indicate decreased perceived disability and improved participation in ADLs/IADLs. 5.  [] Date: progressing 10 point improvement   6. Alpha Pare will demonstrate ability to carry 50lb in a box while walking >= 150ft in order to return to work. 6.  [] Date: progressing carrying 40 lbs   7. Alpha Pare will demonstrate appropriate lifting technique from floor to waist level with 50 lbs and no cues from therapist to complete work related responsibilities 7. [x] Date:12/28/21s   8.  Red Morfin Felipe Mackay will demonstrate ability to lift 45 lbs overhead in order to promote ability to complete household chores. 8.  [x] Date: 12/28/21   9. Tylerce Moment will demonstrate ability to pull 30 lbs in order to complete yardwork and household chores. 9.  [x] Date: 12/28/21   10  Danice Moment will demonstrate ability to lift 60 lbs overhead in order to promote ability to complete heavy lifting required for work. 10  [] Date:   6 Danice Moment will demonstrate appropriate lifting technique from floor to waist level with 100 lbs and no cues from therapist to complete work related responsibilities 11  [] Date:   [address] will demonstrate ability to carry 30 lb at shoulder level to prepare for return to stocking shelves. 12  [] Date:       OUTCOME MEASURE:   Tool Used: Disabilities of the Arm, Shoulder and Hand (DASH) Questionnaire - Quick Version  Score:  Initial: 40/55  Most Recent: 36/55 (Date: 11/3/21 ) Most Recent: 30/55 (Date: 12/28/21 )   Interpretation of Score: The DASH is designed to measure the activities of daily living in person's with upper extremity dysfunction or pain. Each section is scored on a 1-5 scale, 5 representing the greatest disability. The scores of each section are added together for a total score of 55. MEDICAL NECESSITY:   · Patient is expected to demonstrate progress in strength, range of motion, functional technique and postural control/awareness  · Skilled intervention continues to be required due to deficits and impairments seen upon initial evaluation affecting patient's participation in ADLs and functional tasks.     REASON FOR SERVICES/OTHER COMMENTS:  · Patient continues to require present interventions due to patient's inability to lift, carry, push, pull, lift overhead  · Patient continues to require skilled intervention due to deficits and impairments seen upon initial evaluation affecting patient's participation in ADLs and functional tasks. Total Duration: 40 minutes - see daily note  PT Patient Time In/Time Out  Time In: 0945  Time Out: 1030    Rehabilitation Potential For Stated Goals: Good  Regarding Brandichanda Zhu's therapy, I certify that the treatment plan above will be carried out by a therapist or under their direction.   Thank you for this referral,  Vanessa Burns, PT     Referring Physician Signature: Tejal Salazar MD

## 2021-12-30 ENCOUNTER — HOSPITAL ENCOUNTER (OUTPATIENT)
Dept: PHYSICAL THERAPY | Age: 36
Discharge: HOME | End: 2021-12-30

## 2021-12-30 PROCEDURE — 97530 THERAPEUTIC ACTIVITIES: CPT

## 2021-12-30 PROCEDURE — 97110 THERAPEUTIC EXERCISES: CPT

## 2021-12-30 NOTE — PROGRESS NOTES
Elder Elizondo  : 1985  Payor: /  Daniel Rojas at [address] [place] [address]. [facility] [address], [id number] [place] [address] Road  [phone]   [phone]        OUTPATIENT PHYSICAL THERAPY: Daily Treatment Note 2021 Visit Count:  21    Treatment Diagnosis: Pain in Left Shoulder (M25.512)  Stiffness of Left Shoulder not elsewhere specified (M25.612)  Impingement syndrome of Left Shoulder (M75.42)  Muscle Weakness, Generalized (M62.81)  Encounter for other orthopedic aftercare (Z47.89)    Precautions/Allergies:   Patient has no known allergies. 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 Pain: Initial: does not rate/10  Medications Last Reviewed:  2021     tSession: does not rate/10     Updated Objective Findings: None today      TREATMENT:   THERAPEUTIC EXERCISE: ( 15 minutes):  Exercises per grid below to improve mobility, strength and balance. Required minimal visual, verbal and manual cues to promote proper body alignment and promote proper body posture. Progressed resistance and complexity of movement as indicated.      Date:  11/15/21 Date:  21 Date:  21 Date:  21 Date:  21 Date:  21 Date:  12/15/21 Date:  21 Date:  21   Activity/Exercise            Education        assessment of goals for functional changes    Eccentric lat     2.5 lb  5 min with progression toward LTR       Open book 1/2 kneeling  12x  Left/right 1/2 kneeling  12x  Left/right 1/2 kneeling  12x  Left/right 1/2 kneeling  12x  Left/right 1/2 kneeling  12x  Left/right  Shoulder mobility drill  2 min left/right       Lat pull 43 lb  3 x 8 reps  43 lb  3 x 8 reps 47 lb  3 x 8 reps Standing  40 lb  3 x 8 reps Standing, 43 lb  3 x 8 reps  Standing, 43 lb  3 x 8 reps Standing  47 lb  3 x 8 reps   Supine shoulder elevation ,<> depression     Dowel  3 x 5 reps       Rows 33 lb  3 x 8 reps 20 lb DB, bench  3 x8 reps 40 lb machine  3 x 8 reps 40 lb machine  3 x 8 reps  DB,   20 lb  3 x 8 reps    DB  15 lb  3 x 8 reps    Serratus initiation Red band  4 lb  3 x 8 reps Red band  5 lb  3 x 8 reps  Red band  3 lb  3 x 8 reps  Red Band  3 lb  3 x 8 reps  Red Band  3 lb  3 x 8 reps    Kumar Carry 20 lb  3 laps     20 lb  3 laps      Treadmill 8 min  66 calories 63 calories incline 12.5 8 min 66 calories incline 12.5 8 min 45 calories incline 5 8 min 44 calories incline 5 8 min 2 mph, incline 5, 6 min,  44 calories incline 5, 8 min 8 min  Incline  4 8 min, 45 calories, incline 6, 2.3 mph   Scap [place] 90/90  With OH press  3 x 8 reps  90/90  With OH press  3 x 8 reps 90/90 , Red With OH press  3 x 8 reps  Red band  3 x 5 reps  Green band  3 x 5 reps    Box Squat 20 lb  17 in  12 x Squat with OH pull aprat yellow  12x          Self thoracic spinal extension Chair  12 x Chair  12 x 10x         Pallof Press   Red band  2 x 10 reps  Left/right         Push up     38 in  15 x 17 in  3 x 5 reps  17 in  3 x 8 reps    RDL Corrective       Red band, dowel  2 x 10 reps     Face pull         Standing  27 lb  3 x 8 reps         THERAPEUTIC ACTIVITY: ( 25 minutes): Activities per gid below to improve functional movement related mobility, strength and balance to improve neuro-muscular carryover to daily functional activities for improving patient's quality of life. Required visual, verbal and manual cues to promote proper body alignment and promote proper body posture/mechanics. Progressed resistance and complexity of movement as indicated.      Date:  11/15/21 Date:  11/17/21 Date:  11/24/21 Date:  12/6/21 Date:  12/8/21 Date:  12/15/21 Date:  12/28/21 Date:  12/30/21   Activity/Exercise Kumar Carry  20 lb  3 large laps 20 lb  3 large laps 20 lb   3 large laps 25 lb  3 large laps 25 lb  3 large laps  25 lb  3 laps   Dead lift 45 lb  3 x 8 reps   45 lb  X 5 reps  55 lb   3 x 5 reps  45 lb x 5 reps  55 lb x 3 reps  65 lb x 5 reps  75 lb  3 x 3 reps 65 lb x 5 reps  75 lb  3 x 5 reps 65 lb x 5 reps  80 lb  3 x 5 reps   Squat  Instruction in technique and positioning, use of bench to assist with depth  45 lb bar  3 x 8 reps         Beast Walk  30 lb  12x 33 lb  15x 37 lb  15 x 37 lb  X 15 40 lb  3 x 5 reps 40 lb  3 x 5 reps 47 lb  3 x 5 reps   OH Press   Instruction with dowel x 10 reps  20 lb  3 x 8 reps DB 15 lb   3 x 5 reps 25 lb x 5 reps  30 lb  3 x 5 reps 45 lb  3 x 5 reps 45 lb  3 x 5 reps     Carry        10lb/20 lb  3 laps   Rack Carry        15 lb  3 laps         MANUAL THERAPY: (0 minutes): Joint mobilization, Soft tissue mobilization was utilized and necessary because of the patient's restricted joint motion and restricted motion of soft tissue mobility. Date  12/30/2021    Technique Used Grade  Level # Time(s) Effect while being performed   Quadrant  IV- Left shoulder  3 bouts  Decrease joint restriction multi-planes    STM/DTM Deep pressue     Left pec and bicep  5 min  Decrease abnormal muscle tone; improve joint mechanics and mobility. MFR/scar mob Skin glide  All directions; left anterior shoulder  5 min Decrease soft tissue restriction to joint movement                                             HEP Log Date   Open book, LTR, wall slide with lift off 10/6/2021   2. Isometric IR/ER, Ws 10/11/2021   3. S/l ER 10/18/2021   4.    5.           MedBridge Portal  Treatment/Session Summary:    Response to Treatment: Pt requires intermittent cues to set back and take slack of bar before initiating dead lift. Pt with decreased sustained overhead endurance noted with  carry and to benefit from continued strengthening to prepare for return to work. Communication/Consultation:  None today   Equipment provided today: None today   Recommendations/Intent for next treatment session:   Next visit will focus on Core Stability Pain Science Education RTC strengthening soft tissue mobilization. BEGIN Squat next visit       Treatment Plan of Care Effective Dates: 12/28/21 TO 2/26/2022   (60 days).   Frequency/Duration: 2 times a week for 60 Days       Total Treatment Billable Duration: 40 min , 5 min un-timed due to rest  PT Patient Time In/Time Out  Time In: 0945  Time Out: [facility],     Future Appointments   Date Time Provider Simran Mckeon   1/3/2022  9:45 AM Amy Pace, PT [facility]   1/4/2022  2:00 PM Usman Villanueva MD [facility]   1/5/2022  9:45 AM Amy Pace, PT [facility]   1/10/2022  9:45 AM Amy Pace, PT [facility]   1/12/2022  9:45 AM Amy Pace, PT [facility]

## 2022-01-04 PROBLEM — Z09 SURGERY FOLLOW-UP: Status: ACTIVE | Noted: 2022-01-04

## 2022-01-04 PROBLEM — M67.922 TENDINOPATHY OF LEFT BICEPS: Status: ACTIVE | Noted: 2022-01-04

## 2022-01-04 PROBLEM — M19.012 OSTEOARTHRITIS OF LEFT AC (ACROMIOCLAVICULAR) JOINT: Status: ACTIVE | Noted: 2022-01-04

## 2022-01-05 ENCOUNTER — HOSPITAL ENCOUNTER (OUTPATIENT)
Dept: PHYSICAL THERAPY | Age: 37
Discharge: HOME | End: 2022-01-05

## 2022-01-05 NOTE — PROGRESS NOTES
Raúl Altamirano  : 1985  Primary:   Secondary:  Therapy Center at [address], [address]  [phone]   [phone]        OUTPATIENT DAILY NOTE    NAME/AGE/GENDER: Raúl Altamirano is a 43 y.o. male. DATE: 2022    Mr. Charls Cowden for today's appointment due to personal reasons.     Ned Chao, PT

## 2022-01-10 ENCOUNTER — HOSPITAL ENCOUNTER (OUTPATIENT)
Dept: PHYSICAL THERAPY | Age: 37
Discharge: HOME | End: 2022-01-10

## 2022-01-10 PROCEDURE — 97110 THERAPEUTIC EXERCISES: CPT

## 2022-01-10 PROCEDURE — 97530 THERAPEUTIC ACTIVITIES: CPT

## 2022-01-10 NOTE — PROGRESS NOTES
Cece Brown  : 1985  Payor: /  [id number] at [place] [address]. Norbert Jacksonlinda, [address], [place], [address] [place] [address] Road  [phone]   [phone]        OUTPATIENT PHYSICAL THERAPY: Daily Treatment Note 1/10/2022 Visit Count:  22    Treatment Diagnosis: Pain in Left Shoulder (M25.512)  Stiffness of Left Shoulder not elsewhere specified (M25.612)  Impingement syndrome of Left Shoulder (M75.42)  Muscle Weakness, Generalized (M62.81)  Encounter for other orthopedic aftercare (Z47.89)    Precautions/Allergies:   Patient has no known allergies. 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 Pt to go for first shift tomorrow,  22. Pt reports he got a cortisone injection when he went to f/u with MD.   Pain: Initial: does not rate/10  Medications Last Reviewed:  1/10/2022     tSession: does not rate/10     Updated Objective Findings: None today      TREATMENT:   THERAPEUTIC EXERCISE: ( 15 minutes):  Exercises per grid below to improve mobility, strength and balance. Required minimal visual, verbal and manual cues to promote proper body alignment and promote proper body posture. Progressed resistance and complexity of movement as indicated.      Date:  21 Date:  21 Date:  21 Date:  12/15/21 Date:  21 Date:  21 Date:  1/10/22   Activity/Exercise          Education     assessment of goals for functional changes     Eccentric lat  2.5 lb  5 min with progression toward LTR        Open book 1/2 kneeling  12x  Left/right 1/2 kneeling  12x  Left/right  Shoulder mobility drill  2 min left/right        Lat pull 47 lb  3 x 8 reps Standing  40 lb  3 x 8 reps Standing, 43 lb  3 x 8 reps  Standing, 43 lb  3 x 8 reps Standing  47 lb  3 x 8 reps Standing  50 lb  3 x 8 reps   Supine shoulder elevation ,<> depression  Dowel  3 x 5 reps        Rows 40 lb machine  3 x 8 reps  DB,   20 lb  3 x 8 reps    DB  15 lb  3 x 8 reps     Serratus initiation Red band  3 lb  3 x 8 reps  Red Band  3 lb  3 x 8 reps  Red Band  3 lb  3 x 8 reps     Kumar Carry   20 lb  3 laps       Treadmill 45 calories incline 5 8 min 44 calories incline 5 8 min 2 mph, incline 5, 6 min,  44 calories incline 5, 8 min 8 min  Incline  4 8 min, 45 calories, incline 6, 2.3 mph 8 min, 40 calories, incline 4, 2.3 mph   Scap [place] 90/90 , Red With OH press  3 x 8 reps  Red band  3 x 5 reps  Green band  3 x 5 reps     Pallof Press          Push up  38 in  15 x 17 in  3 x 5 reps  17 in  3 x 8 reps     RDL Corrective    Red band, dowel  2 x 10 reps      Face pull      Standing  27 lb  3 x 8 reps Standing  27 lb  3 x 8 reps         THERAPEUTIC ACTIVITY: ( 23 minutes): Activities per gid below to improve functional movement related mobility, strength and balance to improve neuro-muscular carryover to daily functional activities for improving patient's quality of life. Required visual, verbal and manual cues to promote proper body alignment and promote proper body posture/mechanics. Progressed resistance and complexity of movement as indicated.      Date:  12/6/21 Date:  12/8/21 Date:  12/15/21 Date:  12/28/21 Date:  12/30/21 Date:  1/10/22   Activity/Exercise         Kumar Carry 20 lb   3 large laps 25 lb  3 large laps 25 lb  3 large laps  25 lb  3 laps 30lb/25 lb  4 laps   Dead lift 45 lb  X 5 reps  55 lb   3 x 5 reps  45 lb x 5 reps  55 lb x 3 reps  65 lb x 5 reps  75 lb  3 x 3 reps 65 lb x 5 reps  75 lb  3 x 5 reps 65 lb x 5 reps  80 lb  3 x 5 reps    Squat      Goblet, 20 lb  3 x 8 reps   Beast Walk 37 lb  15 x 37 lb  X 15 40 lb  3 x 5 reps 40 lb  3 x 5 reps 47 lb  3 x 5 reps    OH Press DB 15 lb   3 x 5 reps 25 lb x 5 reps  30 lb  3 x 5 reps 45 lb  3 x 5 reps 45 lb  3 x 5 reps      Carry     10lb/20 lb  3 laps 10lb/20 lb  4 laps   Rack Carry     15 lb  3 laps    Lateral Walk outs      20 lb  3 x 8 reps         MANUAL THERAPY: (0 minutes): Joint mobilization, Soft tissue mobilization was utilized and necessary because of the patient's restricted joint motion and restricted motion of soft tissue mobility. Date  1/10/2022    Technique Used Grade  Level # Time(s) Effect while being performed   Quadrant  IV- Left shoulder  3 bouts  Decrease joint restriction multi-planes    STM/DTM Deep pressue     Left pec and bicep  5 min  Decrease abnormal muscle tone; improve joint mechanics and mobility. MFR/scar mob Skin glide  All directions; left anterior shoulder  5 min Decrease soft tissue restriction to joint movement                                             HEP Log Date   Open book, LTR, wall slide with lift off 10/6/2021   2. Isometric IR/ER, Ws 10/11/2021   3. S/l ER 10/18/2021   4.    5.           [employer] Portal  Treatment/Session Summary:    Response to Treatment: Pt session today focus on pulling and carrying to prepare for return to work tomorrow. Pt to continue with OH strengthening and complete dead lifts next session. Communication/Consultation:  None today   Equipment provided today: None today   Recommendations/Intent for next treatment session:   Next visit will focus on Core Stability Pain Science Education RTC strengthening soft tissue mobilization. BEGIN Squat next visit       Treatment Plan of Care Effective Dates: 12/28/21 TO 2/26/2022   (60 days).   Frequency/Duration: 2 times a week for 60 Days       Total Treatment Billable Duration: 38 min   PT Patient Time In/Time Out  Time In: 0945  Time Out: 605 Brendan Dasilva PT    Future Appointments   Date Time Provider Simran Mckeon   1/12/2022  9:45 AM Shannen Gallego, PT [facility]   2/1/2022  1:30 PM MD SHRUTI Barnes

## 2022-01-12 ENCOUNTER — HOSPITAL ENCOUNTER (OUTPATIENT)
Dept: PHYSICAL THERAPY | Age: 37
Discharge: HOME | End: 2022-01-12

## 2022-01-12 NOTE — PROGRESS NOTES
Kati Kong  : 1985  Primary:   Secondary:  Therapy Center at [place] [address], [address]  [phone]   [phone]        OUTPATIENT DAILY NOTE    NAME/AGE/GENDER: Kati Kong is a 42 y.o. male. DATE: 2022    Mr. Swanson Flatten for today's appointment due to no transportation.     hSane Feng, PT

## 2022-01-18 ENCOUNTER — HOSPITAL ENCOUNTER (OUTPATIENT)
Dept: PHYSICAL THERAPY | Age: 37
Discharge: HOME | End: 2022-01-18

## 2022-01-18 NOTE — PROGRESS NOTES
Cece Brown  : 1985  Primary:   Secondary:  [facility] [address] [facility]. [place] [address], [address]  [phone]   [phone]      Mr. Brianna Wise for today's appointment due to inclement weather.     Jen Patterson, PT, DPT

## 2022-01-19 ENCOUNTER — HOSPITAL ENCOUNTER (OUTPATIENT)
Dept: PHYSICAL THERAPY | Age: 37
Discharge: HOME | End: 2022-01-19

## 2022-01-19 PROCEDURE — 97110 THERAPEUTIC EXERCISES: CPT

## 2022-01-19 PROCEDURE — 97530 THERAPEUTIC ACTIVITIES: CPT

## 2022-01-19 NOTE — PROGRESS NOTES
Raúl Altamirano  : 1985  Payor: SELF PAY / [facility] / Product Type: Self Pay /  [address] at [address] [place]. [address] [place] [address] [address], [place], [address] [address] [address]  [phone]   [phone]        OUTPATIENT PHYSICAL THERAPY: Daily Treatment Note 2022 Visit Count:  23    Treatment Diagnosis: Pain in Left Shoulder (M25.512)  Stiffness of Left Shoulder not elsewhere specified (M25.612)  Impingement syndrome of Left Shoulder (M75.42)  Muscle Weakness, Generalized (M62.81)  Encounter for other orthopedic aftercare (Z47.89)    Precautions/Allergies:   Patient has no known allergies. 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 Pt to go for first shift tomorrow,  22. Pt reports he got a cortisone injection when he went to f/u with MD.   Pain: Initial: does not rate/10  Medications Last Reviewed:  2022     tSession: does not rate/10     Updated Objective Findings: None today      TREATMENT:   THERAPEUTIC EXERCISE: ( 8 minutes):  Exercises per grid below to improve mobility, strength and balance. Required minimal visual, verbal and manual cues to promote proper body alignment and promote proper body posture. Progressed resistance and complexity of movement as indicated.      Date:  21 Date:  21 Date:  21 Date:  12/15/21 Date:  21 Date:  21 Date:  1/10/22 Date:  22   Activity/Exercise           Education     assessment of goals for functional changes      Eccentric lat  2.5 lb  5 min with progression toward LTR         Open book 1/2 kneeling  12x  Left/right 1/2 kneeling  12x  Left/right  Shoulder mobility drill  2 min left/right         Lat pull 47 lb  3 x 8 reps Standing  40 lb  3 x 8 reps Standing, 43 lb  3 x 8 reps  Standing, 43 lb  3 x 8 reps Standing  47 lb  3 x 8 reps Standing  50 lb  3 x 8 reps    Supine shoulder elevation ,<> depression  Dowel  3 x 5 reps         Rows 40 lb machine  3 x 8 reps  DB,   20 lb  3 x 8 reps    DB  15 lb  3 x 8 reps      Serratus initiation Red band  3 lb  3 x 8 reps  Red Band  3 lb  3 x 8 reps  Red Band  3 lb  3 x 8 reps      Kumar Carry   20 lb  3 laps        Treadmill 45 calories incline 5 8 min 44 calories incline 5 8 min 2 mph, incline 5, 6 min,  44 calories incline 5, 8 min 8 min  Incline  4 8 min, 45 calories, incline 6, 2.3 mph 8 min, 40 calories, incline 4, 2.3 mph 8 min, 64 calories, incline 7, 2.8 mph   Scap [place] 90/90 , Red With OH press  3 x 8 reps  Red band  3 x 5 reps  Green band  3 x 5 reps      Pallof Press           Push up  38 in  15 x 17 in  3 x 5 reps  17 in  3 x 8 reps      RDL Corrective    Red band, dowel  2 x 10 reps       Face pull      Standing  27 lb  3 x 8 reps Standing  27 lb  3 x 8 reps          THERAPEUTIC ACTIVITY: ( 32 minutes): Activities per gid below to improve functional movement related mobility, strength and balance to improve neuro-muscular carryover to daily functional activities for improving patient's quality of life. Required visual, verbal and manual cues to promote proper body alignment and promote proper body posture/mechanics. Progressed resistance and complexity of movement as indicated.      Date:  12/6/21 Date:  12/8/21 Date:  12/15/21 Date:  12/28/21 Date:  12/30/21 Date:  1/10/22 Date:  1/19/22   Activity/Exercise          Kumar Carry 20 lb   3 large laps 25 lb  3 large laps 25 lb  3 large laps  25 lb  3 laps 30lb/25 lb  4 laps 30lb/25 lb  2 laps   Dead lift 45 lb  X 5 reps  55 lb   3 x 5 reps  45 lb x 5 reps  55 lb x 3 reps  65 lb x 5 reps  75 lb  3 x 3 reps 65 lb x 5 reps  75 lb  3 x 5 reps 65 lb x 5 reps  80 lb  3 x 5 reps  75 lb x 5 reps  85 lb x 3 reps  90 lb  3 x 5 reps   Squat      Goblet, 20 lb  3 x 8 reps    Beast Walk 37 lb  15 x 37 lb  X 15 40 lb  3 x 5 reps 40 lb  3 x 5 reps 47 lb  3 x 5 reps     OH Press DB 15 lb   3 x 5 reps 25 lb x 5 reps  30 lb  3 x 5 reps 45 lb  3 x 5 reps 45 lb  3 x 5 reps   45 lb x 5 reps  50 lb  3 x 5 reps    Carry     10lb/20 lb  3 laps 10lb/20 lb  4 laps 10lb/25 lb  2 laps   Rack Carry     15 lb  3 laps  15 lb  2 laps   Lateral Walk outs      20 lb  3 x 8 reps 30 lb  2 x 8 reps   Upright row       25 lb KB  3 x 5 reps         MANUAL THERAPY: (0 minutes): Joint mobilization, Soft tissue mobilization was utilized and necessary because of the patient's restricted joint motion and restricted motion of soft tissue mobility. Date  1/19/2022    Technique Used Grade  Level # Time(s) Effect while being performed   Quadrant  IV- Left shoulder  3 bouts  Decrease joint restriction multi-planes    STM/DTM Deep pressue     Left pec and bicep  5 min  Decrease abnormal muscle tone; improve joint mechanics and mobility. MFR/scar mob Skin glide  All directions; left anterior shoulder  5 min Decrease soft tissue restriction to joint movement                                             HEP Log Date   Open book, LTR, wall slide with lift off 10/6/2021   2. Isometric IR/ER, Ws 10/11/2021   3. S/l ER 10/18/2021   4.    5.           MedBridge Portal  Treatment/Session Summary:    Response to Treatment: Pt with improving ability to carry and lift weight consistent with needs at work. Pt continues to have difficulty with sustaining positioning of box at work due to IR bias and will contninue to address with upright rows to improve strength and control. Pt requires cues today to maintain thoracic extension control during dead lift with increased fatigue.     Communication/Consultation:  None today   Equipment provided today: None today   Recommendations/Intent for next treatment session:   Next visit will focus on Core Stability Pain Science Education RTC strengthening soft tissue mobilization. BEGIN Squat next visit       Treatment Plan of Care Effective Dates: 12/28/21 TO 2/26/2022   (60 days).   Frequency/Duration: 2 times a week for 60 Days       Total Treatment Billable Duration: 40 min 5 min untimed due to rest  PT Patient Time In/Time Out  Time In: 1515  Time Out: 1600  Shen Peters, REJI    Future Appointments   Date Time Provider Simran Mckeon   1/27/2022  9:45 AM Nathalia Escalona PT [facility]   2/1/2022  9:45 AM Nathalia Escalona PT [facility]   2/1/2022  1:30 PM MD SHRUTI Oconnell   2/3/2022  9:45 AM Nathalia Escalona PT [facility]

## 2022-01-27 ENCOUNTER — HOSPITAL ENCOUNTER (OUTPATIENT)
Dept: PHYSICAL THERAPY | Age: 37
Discharge: HOME | End: 2022-01-27

## 2022-01-27 NOTE — PROGRESS NOTES
Luicana Tena  : 1985  Primary:   Secondary:  Therapy Center at [address]. [facility], [address]  [phone]   [phone]        OUTPATIENT DAILY NOTE    NAME/AGE/GENDER: Luciana Tena is a 42 y.o. male. DATE: 2022    Mr. Nevin Lopez for today's appointment due to personal conflict.     Dada Marrero, PT

## 2022-02-01 ENCOUNTER — HOSPITAL ENCOUNTER (OUTPATIENT)
Dept: PHYSICAL THERAPY | Age: 37
Discharge: HOME | End: 2022-02-01

## 2022-02-01 NOTE — PROGRESS NOTES
Patricia Chapman  : 1985  Primary:   Secondary:  Therapy Center at [facility] [address]  [address]. [facility], [address]  [phone]   [phone]        OUTPATIENT DAILY NOTE    NAME/AGE/GENDER: Patricia Chapman is a 42 y.o. male. DATE: 2022    Mr. Salas Cart for today's appointment due to personal reasons.     Karolina John, PT

## 2022-02-03 ENCOUNTER — HOSPITAL ENCOUNTER (OUTPATIENT)
Dept: PHYSICAL THERAPY | Age: 37
Discharge: HOME | End: 2022-02-03

## 2022-02-03 NOTE — PROGRESS NOTES
Katlin Knight  : 1985  Primary:   Secondary:  Therapy Center at . [place] [address]  [address]. [facility] 25, [address]  [phone]   [phone]        OUTPATIENT DAILY NOTE    NAME/AGE/GENDER: Katlin Knight is a 42 y.o. male. DATE: 2/3/2022    Mr. Jake Simonss for today's appointment due to MD requesting therapy to go down to 1x/wk.     Claus Celestin, PT

## 2022-03-02 NOTE — THERAPY DISCHARGE
Manju Brenner  : 1985  Primary:   Secondary:  [facility] . [address]  [address]. [place] [address], [address]  [phone]   [phone]         OUTPATIENT PHYSICAL THERAPY: DISCONTINUATION 3/2/22   Treatment Diagnosis: Pain in Left Shoulder (M25.512)  Stiffness of Left Shoulder not elsewhere specified (M25.612)  Impingement syndrome of Left Shoulder (M75.42)  Muscle Weakness, Generalized (M62.81)  Encounter for other orthopedic aftercare (Z47.89)    Precautions/Allergies:   Sulfa (sulfonamide antibiotics)   MD Orders: evaluate and treat  MEDICAL/REFERRING DIAGNOSIS:   Tendinopathy of left biceps   DATE OF ONSET: 21  REFERRING PHYSICIAN: Marylou Adams MD  RETURN PHYSICIAN APPOINTMENT: Oct 14, 2021     DISCONTINUATION:  Mr. Blayne Valentine has not returned to therapy since 2022 and will be considered a self d/c. PT POC closed. TREATMENT PLAN:  Effective Dates: 2021 TO 2021 (90 days). Frequency/Duration: 2 times a week for 90 Day(s)    GOALS: (Goals have been discussed and agreed upon with patient.)    Goals: 45 days  Goal Met   1. Manju Brenner will be independent with HEP to maintain functional gains made with therapy intervention. 1.  [] Date: progressing, therapist continues to modify   2. Manju Brenner will demonstrate increased shoulder flexion  to 165 degrees without pain to promote active use for overhead reaching. 2.  [x] Date:21   3. Manju Brenner will demonstrate increased shoulder external rotation to 70 degrees  At 90 dg abduction without pain to promote donning of seat belt. Goal range wrote in error at 70dg at 90 dg abduction - should read 25 dg at 90 dg abduction 3. [] Date: progressing 15 dg   4. Manju Brenner will report sleeping through the night and not waking due to shoulder pain. 4.  [x] Date: 21   5.  Manju Brenner will demonstrate a 20 point increase on the DASH to indicate decreased perceived disability and improved participation in ADLs/IADLs. 5.  [] Date: progressing 10 point improvement   6. Naomia Basket will demonstrate ability to carry 50lb in a box while walking >= 150ft in order to return to work. 6.  [] Date: progressing carrying 40 lbs   7. Naomia Basket will demonstrate appropriate lifting technique from floor to waist level with 50 lbs and no cues from therapist to complete work related responsibilities 7. [x] Date:12/28/21s   8. Naomia Basket will demonstrate ability to lift 45 lbs overhead in order to promote ability to complete household chores. 8.  [x] Date: 12/28/21   9. Naomia Basket will demonstrate ability to pull 30 lbs in order to complete yardwork and household chores. 9.  [x] Date: 12/28/21   10  Naomia Basket will demonstrate ability to lift 60 lbs overhead in order to promote ability to complete heavy lifting required for work. 10  [] Date:   6 Naomia Basket will demonstrate appropriate lifting technique from floor to waist level with 100 lbs and no cues from therapist to complete work related responsibilities 11  [] Date:   [address] will demonstrate ability to carry 30 lb at shoulder level to prepare for return to stocking shelves. 12  [] Date:       OUTCOME MEASURE:   Tool Used: Disabilities of the Arm, Shoulder and Hand (DASH) Questionnaire - Quick Version  Score:  Initial: 40/55  Most Recent: 36/55 (Date: 11/3/21 ) Most Recent: 30/55 (Date: 12/28/21 )   Interpretation of Score: The DASH is designed to measure the activities of daily living in person's with upper extremity dysfunction or pain. Each section is scored on a 1-5 scale, 5 representing the greatest disability. The scores of each section are added together for a total score of 55.         Total Duration: 0 min    Thank you for this referral,  Claus Celestin PT     Referring Physician Signature: Stefano Walker MD

## 2022-03-18 PROBLEM — M67.922 TENDINOPATHY OF LEFT BICEPS: Status: ACTIVE | Noted: 2022-01-04

## 2022-03-18 PROBLEM — F32.A DEPRESSIVE DISORDER: Status: ACTIVE | Noted: 2019-07-25

## 2022-03-19 PROBLEM — Z09 SURGERY FOLLOW-UP: Status: ACTIVE | Noted: 2022-01-04

## 2022-03-19 PROBLEM — S52.124A CLOSED NONDISPLACED FRACTURE OF HEAD OF RIGHT RADIUS: Status: ACTIVE | Noted: 2017-05-15

## 2022-03-19 PROBLEM — M79.641 RIGHT HAND PAIN: Status: ACTIVE | Noted: 2017-05-15

## 2022-03-19 PROBLEM — F41.9 ANXIETY: Status: ACTIVE | Noted: 2019-07-25

## 2022-03-19 PROBLEM — M19.012 OSTEOARTHRITIS OF LEFT AC (ACROMIOCLAVICULAR) JOINT: Status: ACTIVE | Noted: 2022-01-04

## 2022-03-19 PROBLEM — F10.10 ALCOHOL ABUSE: Status: ACTIVE | Noted: 2019-07-25

## 2022-03-19 PROBLEM — Z79.899 CONTROLLED SUBSTANCE AGREEMENT SIGNED: Status: ACTIVE | Noted: 2019-02-15

## 2022-03-20 PROBLEM — F90.0 ATTENTION DEFICIT HYPERACTIVITY DISORDER, PREDOMINANTLY INATTENTIVE TYPE: Status: ACTIVE | Noted: 2019-07-25

## 2022-03-20 PROBLEM — M25.512 LEFT SHOULDER PAIN: Status: ACTIVE | Noted: 2021-03-26

## 2023-07-30 ENCOUNTER — APPOINTMENT (OUTPATIENT)
Dept: GENERAL RADIOLOGY | Age: 38
End: 2023-07-30

## 2023-07-30 ENCOUNTER — HOSPITAL ENCOUNTER (EMERGENCY)
Age: 38
Discharge: HOME | End: 2023-07-30
Attending: STUDENT IN AN ORGANIZED HEALTH CARE EDUCATION/TRAINING PROGRAM

## 2023-07-30 VITALS
DIASTOLIC BLOOD PRESSURE: 96 MMHG | TEMPERATURE: 98.3 F | SYSTOLIC BLOOD PRESSURE: 134 MMHG | HEART RATE: 90 BPM | OXYGEN SATURATION: 94 % | RESPIRATION RATE: 18 BRPM

## 2023-07-30 DIAGNOSIS — J02.9 ACUTE PHARYNGITIS, UNSPECIFIED ETIOLOGY: Primary | ICD-10-CM

## 2023-07-30 DIAGNOSIS — R05.9 COUGH, UNSPECIFIED TYPE: ICD-10-CM

## 2023-07-30 LAB
EKG ATRIAL RATE: 86 BPM
EKG DIAGNOSIS: NORMAL
EKG P AXIS: 60 DEGREES
EKG P-R INTERVAL: 152 MS
EKG Q-T INTERVAL: 358 MS
EKG QRS DURATION: 99 MS
EKG QTC CALCULATION (BAZETT): 431 MS
EKG R AXIS: -59 DEGREES
EKG T AXIS: 42 DEGREES
EKG VENTRICULAR RATE: 87 BPM
SARS-COV-2 RDRP RESP QL NAA+PROBE: NOT DETECTED
SOURCE: NORMAL
STREP, MOLECULAR: NOT DETECTED

## 2023-07-30 PROCEDURE — 87651 STREP A DNA AMP PROBE: CPT

## 2023-07-30 PROCEDURE — 99285 EMERGENCY DEPT VISIT HI MDM: CPT

## 2023-07-30 PROCEDURE — 93005 ELECTROCARDIOGRAM TRACING: CPT | Performed by: STUDENT IN AN ORGANIZED HEALTH CARE EDUCATION/TRAINING PROGRAM

## 2023-07-30 PROCEDURE — 96372 THER/PROPH/DIAG INJ SC/IM: CPT

## 2023-07-30 PROCEDURE — 93010 ELECTROCARDIOGRAM REPORT: CPT | Performed by: INTERNAL MEDICINE

## 2023-07-30 PROCEDURE — 6360000002 HC RX W HCPCS: Performed by: STUDENT IN AN ORGANIZED HEALTH CARE EDUCATION/TRAINING PROGRAM

## 2023-07-30 PROCEDURE — 71045 X-RAY EXAM CHEST 1 VIEW: CPT

## 2023-07-30 PROCEDURE — 87635 SARS-COV-2 COVID-19 AMP PRB: CPT

## 2023-07-30 RX ORDER — DEXAMETHASONE SODIUM PHOSPHATE 10 MG/ML
6 INJECTION INTRAMUSCULAR; INTRAVENOUS
Status: COMPLETED | OUTPATIENT
Start: 2023-07-30 | End: 2023-07-30

## 2023-07-30 RX ORDER — ALBUTEROL SULFATE 90 UG/1
2 AEROSOL, METERED RESPIRATORY (INHALATION) EVERY 6 HOURS PRN
Qty: 18 G | Refills: 0 | Status: SHIPPED | OUTPATIENT
Start: 2023-07-30

## 2023-07-30 RX ORDER — NAPROXEN 250 MG/1
250 TABLET ORAL 2 TIMES DAILY WITH MEALS
Qty: 20 TABLET | Refills: 0 | Status: SHIPPED | OUTPATIENT
Start: 2023-07-30

## 2023-07-30 RX ORDER — KETOROLAC TROMETHAMINE 15 MG/ML
15 INJECTION, SOLUTION INTRAMUSCULAR; INTRAVENOUS ONCE
Status: COMPLETED | OUTPATIENT
Start: 2023-07-30 | End: 2023-07-30

## 2023-07-30 RX ADMIN — DEXAMETHASONE SODIUM PHOSPHATE 6 MG: 10 INJECTION INTRAMUSCULAR; INTRAVENOUS at 07:34

## 2023-07-30 RX ADMIN — KETOROLAC TROMETHAMINE 15 MG: 15 INJECTION, SOLUTION INTRAMUSCULAR; INTRAVENOUS at 07:34

## 2023-07-30 ASSESSMENT — PAIN - FUNCTIONAL ASSESSMENT: PAIN_FUNCTIONAL_ASSESSMENT: NONE - DENIES PAIN

## 2023-07-30 ASSESSMENT — PAIN SCALES - GENERAL: PAINLEVEL_OUTOF10: 7

## 2023-07-30 ASSESSMENT — PAIN DESCRIPTION - LOCATION: LOCATION: CHEST

## 2023-07-30 NOTE — ED PROVIDER NOTES
[address]  Emergency Department    DISPOSITION Decision To Discharge 07/30/2023 07:07:46 AM       ICD-10-CM    1. Acute pharyngitis, unspecified etiology  J02.9       2. Cough, unspecified type  R05.9         ED Course     ED Course as of 07/30/23 0714   Tiburcio Moore Jul 30, 2023   200 47-year-old male no pertinent past medical history presents with 4 days of sore throat, cough, shortness of breath. Vitals within normal limits; no hypoxia. Does have some tonsillar erythema with exudates on exam.  Lungs CTA B. Will obtain COVID-19 and strep swabs as well as chest x-ray. Through shared decision-making, patient elected to hold off on blood work at this time. PERC negative. [ER]   0641 EKG: Normal sinus rhythm, rate 87. Left anterior fascicular block. Nonspecific ST and T wave changes. No STEMI. Time: 5030 [ER]   0713 COVID and strep negative. CXR negative. Given steroids and Toradol in ER. Still for discharge home with symptomatic therapy for pharyngitis and URI. Return precautions given [ER]      ED Course User Index  [ER] Bianka Capone MD     Complexity of Problems Addressed:  1 or more stable acute illnesses    Data Reviewed and Analyzed:  Category 1:   I independently ordered and reviewed each unique test.    Category 2:   I independently ordered and interpreted the ED EKG in the absence of a Cardiologist.    I interpreted the X-rays no obvious pneumothorax. I interpreted the labs. Category 3: Discussion of management or test interpretation. Please see ED course above    Risk of Complications and/or Morbidity of Patient Management:  The following clinical decision tools were used in the care of this patient PERC criteria. Is this patient to be included in the SEP-1 core measure due to severe sepsis or septic shock?  No Exclusion criteria - the patient is NOT to be included for SEP-1 Core Measure due to: Viral etiology found or highly suspected (including COVID-19) without

## 2023-07-30 NOTE — DISCHARGE INSTRUCTIONS
He may take the medication as directed. Take Tylenol in addition to the prescribed medication. Be sure to drink plenty of fluids to stay well-hydrated.   Return to the ER if any new or worsening symptoms

## 2024-04-09 ENCOUNTER — HOSPITAL ENCOUNTER (EMERGENCY)
Age: 39
Discharge: HOME | End: 2024-04-09

## 2024-04-09 ENCOUNTER — APPOINTMENT (OUTPATIENT)
Dept: GENERAL RADIOLOGY | Age: 39
End: 2024-04-09

## 2024-04-09 VITALS
TEMPERATURE: 98 F | HEART RATE: 74 BPM | DIASTOLIC BLOOD PRESSURE: 93 MMHG | OXYGEN SATURATION: 95 % | BODY MASS INDEX: 34.07 KG/M2 | SYSTOLIC BLOOD PRESSURE: 132 MMHG | WEIGHT: 230 LBS | HEIGHT: 69 IN | RESPIRATION RATE: 15 BRPM

## 2024-04-09 DIAGNOSIS — R07.81 RIB PAIN ON LEFT SIDE: Primary | ICD-10-CM

## 2024-04-09 DIAGNOSIS — M54.12 CERVICAL RADICULOPATHY: ICD-10-CM

## 2024-04-09 LAB
ANION GAP SERPL CALC-SCNC: 5 MMOL/L (ref 2–11)
BASOPHILS # BLD: 0 K/UL (ref 0–0.2)
BASOPHILS NFR BLD: 1 % (ref 0–2)
BUN SERPL-MCNC: 12 MG/DL (ref 6–23)
CALCIUM SERPL-MCNC: 9.3 MG/DL (ref 8.3–10.4)
CHLORIDE SERPL-SCNC: 108 MMOL/L (ref 103–113)
CO2 SERPL-SCNC: 26 MMOL/L (ref 21–32)
CREAT SERPL-MCNC: 0.87 MG/DL (ref 0.8–1.5)
D DIMER PPP FEU-MCNC: <0.27 UG/ML(FEU)
DIFFERENTIAL METHOD BLD: ABNORMAL
EKG ATRIAL RATE: 82 BPM
EKG DIAGNOSIS: NORMAL
EKG P AXIS: 27 DEGREES
EKG P-R INTERVAL: 148 MS
EKG Q-T INTERVAL: 350 MS
EKG QRS DURATION: 96 MS
EKG QTC CALCULATION (BAZETT): 408 MS
EKG R AXIS: -48 DEGREES
EKG T AXIS: -2 DEGREES
EKG VENTRICULAR RATE: 82 BPM
EOSINOPHIL # BLD: 0.1 K/UL (ref 0–0.8)
EOSINOPHIL NFR BLD: 2 % (ref 0.5–7.8)
ERYTHROCYTE [DISTWIDTH] IN BLOOD BY AUTOMATED COUNT: 12.3 % (ref 11.9–14.6)
GLUCOSE SERPL-MCNC: 107 MG/DL (ref 65–100)
HCT VFR BLD AUTO: 46.4 % (ref 41.1–50.3)
HGB BLD-MCNC: 15.4 G/DL (ref 13.6–17.2)
IMM GRANULOCYTES # BLD AUTO: 0 K/UL (ref 0–0.5)
IMM GRANULOCYTES NFR BLD AUTO: 0 % (ref 0–5)
LYMPHOCYTES # BLD: 2.3 K/UL (ref 0.5–4.6)
LYMPHOCYTES NFR BLD: 35 % (ref 13–44)
MCH RBC QN AUTO: 30.4 PG (ref 26.1–32.9)
MCHC RBC AUTO-ENTMCNC: 33.2 G/DL (ref 31.4–35)
MCV RBC AUTO: 91.5 FL (ref 82–102)
MONOCYTES # BLD: 0.5 K/UL (ref 0.1–1.3)
MONOCYTES NFR BLD: 8 % (ref 4–12)
NEUTS SEG # BLD: 3.6 K/UL (ref 1.7–8.2)
NEUTS SEG NFR BLD: 54 % (ref 43–78)
NRBC # BLD: 0 K/UL (ref 0–0.2)
PLATELET # BLD AUTO: 148 K/UL (ref 150–450)
PMV BLD AUTO: 10.9 FL (ref 9.4–12.3)
POTASSIUM SERPL-SCNC: 4.7 MMOL/L (ref 3.5–5.1)
RBC # BLD AUTO: 5.07 M/UL (ref 4.23–5.6)
SODIUM SERPL-SCNC: 139 MMOL/L (ref 136–146)
TROPONIN I SERPL HS-MCNC: 3.3 PG/ML (ref 0–14)
TROPONIN I SERPL HS-MCNC: 5.3 PG/ML (ref 0–14)
WBC # BLD AUTO: 6.7 K/UL (ref 4.3–11.1)

## 2024-04-09 PROCEDURE — 93005 ELECTROCARDIOGRAM TRACING: CPT | Performed by: STUDENT IN AN ORGANIZED HEALTH CARE EDUCATION/TRAINING PROGRAM

## 2024-04-09 PROCEDURE — 6360000002 HC RX W HCPCS

## 2024-04-09 PROCEDURE — 94762 N-INVAS EAR/PLS OXIMTRY CONT: CPT

## 2024-04-09 PROCEDURE — 85379 FIBRIN DEGRADATION QUANT: CPT

## 2024-04-09 PROCEDURE — 80048 BASIC METABOLIC PNL TOTAL CA: CPT

## 2024-04-09 PROCEDURE — 93010 ELECTROCARDIOGRAM REPORT: CPT | Performed by: INTERNAL MEDICINE

## 2024-04-09 PROCEDURE — 99285 EMERGENCY DEPT VISIT HI MDM: CPT

## 2024-04-09 PROCEDURE — 96374 THER/PROPH/DIAG INJ IV PUSH: CPT

## 2024-04-09 PROCEDURE — 71046 X-RAY EXAM CHEST 2 VIEWS: CPT

## 2024-04-09 PROCEDURE — 84484 ASSAY OF TROPONIN QUANT: CPT

## 2024-04-09 PROCEDURE — 85025 COMPLETE CBC W/AUTO DIFF WBC: CPT

## 2024-04-09 RX ORDER — CYCLOBENZAPRINE HCL 10 MG
10 TABLET ORAL 3 TIMES DAILY PRN
Qty: 21 TABLET | Refills: 0 | Status: SHIPPED | OUTPATIENT
Start: 2024-04-09 | End: 2024-04-19

## 2024-04-09 RX ORDER — NALOXONE HYDROCHLORIDE 2 MG/.4ML
2 INJECTION, SOLUTION INTRAMUSCULAR; SUBCUTANEOUS
Qty: 0.4 ML | Refills: 0 | Status: SHIPPED | OUTPATIENT
Start: 2024-04-09 | End: 2024-04-09

## 2024-04-09 RX ORDER — KETOROLAC TROMETHAMINE 15 MG/ML
15 INJECTION, SOLUTION INTRAMUSCULAR; INTRAVENOUS ONCE
Status: COMPLETED | OUTPATIENT
Start: 2024-04-09 | End: 2024-04-09

## 2024-04-09 RX ORDER — PREDNISONE 50 MG/1
50 TABLET ORAL DAILY
Qty: 5 TABLET | Refills: 0 | Status: SHIPPED | OUTPATIENT
Start: 2024-04-09 | End: 2024-04-14

## 2024-04-09 RX ORDER — HYDROCODONE BITARTRATE AND ACETAMINOPHEN 5; 325 MG/1; MG/1
1 TABLET ORAL EVERY 6 HOURS PRN
Qty: 10 TABLET | Refills: 0 | Status: SHIPPED | OUTPATIENT
Start: 2024-04-09 | End: 2024-04-12

## 2024-04-09 RX ADMIN — KETOROLAC TROMETHAMINE 15 MG: 15 INJECTION, SOLUTION INTRAMUSCULAR; INTRAVENOUS at 13:49

## 2024-04-09 ASSESSMENT — PAIN DESCRIPTION - LOCATION
LOCATION: RIB CAGE
LOCATION: RIB CAGE
LOCATION: CHEST

## 2024-04-09 ASSESSMENT — PAIN SCALES - GENERAL
PAINLEVEL_OUTOF10: 5

## 2024-04-09 ASSESSMENT — LIFESTYLE VARIABLES
HOW OFTEN DO YOU HAVE A DRINK CONTAINING ALCOHOL: NEVER
HOW MANY STANDARD DRINKS CONTAINING ALCOHOL DO YOU HAVE ON A TYPICAL DAY: PATIENT DOES NOT DRINK

## 2024-04-09 NOTE — ED TRIAGE NOTES
Patient presents with left sided rib pain, patient states he had a broken rib about a month and a half ago. Pt states he has been having intermittent shortness of breath .    Mariella Winter RN

## 2024-04-09 NOTE — DISCHARGE INSTRUCTIONS
Please begin taking the prednisone daily as prescribed.  You can also take the Flexeril, this is a muscle relaxer as needed.  You can use the Norco for worsening pain, do not take this medication with any alcohol, drive, or operate machinery.  Do not take it in combination with the Flexeril as they both will make you sedated.  Please continue to perform gentle range of motion and refrain from any heavy lifting or strenuous exercise.  If you do begin to experience any new or worsening symptoms return to the ED immediately.  Otherwise please follow-up with the orthopedics, their numbers listed above.

## 2024-04-09 NOTE — ED PROVIDER NOTES
continued outpatient management for most likely continue rib contusion as well as cervical radiculopathy.  Will refer him to orthopedics for further evaluation and management.  Will start him on a prednisone burst for pain and inflammation, and a muscle laxer.  Will send him home with a short course of Norco as well.  He was educated on medication use and possible side effects.  He was given strict return precautions.  He verbalized understanding with plan of care and left facility in stable condition.     1 acute complicated illness or injury.  Prescription drug management performed.  Shared medical decision making was utilized in creating the patients health plan today.    I independently ordered and reviewed each unique test.  I reviewed external records: ED visit note from an outside group.  I reviewed external records: provider visit note from PCP.  I reviewed external records: provider visit note from outside specialist.     I interpreted the X-rays no pneumonia.  No pneumothorax..  EKG reveals normal sinus rhythm at 82 bpm, incomplete right bundle branch block appears consistent with previous EKG.            History     Patient is a 39-year-old male presenting with left-sided rib pain.  Patient states he fell about a month and a half ago onto his arm of his couch, he was evaluated at [place] who diagnosed him with a possible rib fracture, there is no obvious fracture on x-ray.  Patient states since then his pain has continued to worsen.  He states it is worse with movement and palpation and slightly relieved with rest.  He does feel some associated shortness of breath.  He is now having some associated pain in his left trapezius as well that intermittently radiated down his left upper extremity.  He denied any numbness, tingling, or paresthesias.  Denies any pallor or coolness to the touch in his upper extremities.  He denies any chest pain on exertion, nausea, diaphoresis, arm pain, jaw pain, or any other

## 2024-04-09 NOTE — ED NOTES
Patient mobility status  with no difficulty. Provider aware     I have reviewed discharge instructions with the patient.  The patient verbalized understanding.    Patient left ED via Discharge Method: ambulatory to Home with  self. .    Opportunity for questions and clarification provided.     Patient given 4 scripts.

## 2024-05-02 ENCOUNTER — OFFICE VISIT (OUTPATIENT)
Dept: ORTHOPEDIC SURGERY | Age: 39
End: 2024-05-02

## 2024-05-02 VITALS — HEIGHT: 69 IN | BODY MASS INDEX: 34.07 KG/M2 | WEIGHT: 230 LBS

## 2024-05-02 DIAGNOSIS — M54.9 BACK PAIN, UNSPECIFIED BACK LOCATION, UNSPECIFIED BACK PAIN LATERALITY, UNSPECIFIED CHRONICITY: Primary | ICD-10-CM

## 2024-05-02 PROCEDURE — 99204 OFFICE O/P NEW MOD 45 MIN: CPT | Performed by: PHYSICIAN ASSISTANT

## 2024-05-02 RX ORDER — PREDNISONE 10 MG/1
10 TABLET ORAL SEE ADMIN INSTRUCTIONS
Qty: 48 EACH | Refills: 0 | Status: SHIPPED | OUTPATIENT
Start: 2024-05-02 | End: 2024-05-14

## 2024-05-02 RX ORDER — TIZANIDINE 2 MG/1
2 TABLET ORAL 3 TIMES DAILY PRN
Qty: 90 TABLET | Refills: 2 | Status: SHIPPED | OUTPATIENT
Start: 2024-05-02

## 2024-05-02 NOTE — PROGRESS NOTES
Name: Jamir Hernández  YOB: 1985  Gender: male  MRN: [id number]    CC:   Chief Complaint   Patient presents with    New Patient     Back pain         HPI:   Jamir Hernández is a 39 y.o. male with a PMHx of no health insurance, comorbidities below.      They present here for evaluation of upper back pain.  This been going on for years.  He had an episode in January February where he was jumping off a box bring her onto a box spring of a bed and injured his ribs and shoulder and back.  Seen in the emergency room and he sustained a rib fracture.  He has been taking over-the-counter Tylenol.  He is having pain in the upper back between the shoulder blades.  Tender to touch.  He works at a [employer].          Past Medical History Includes:   Past Medical History:   Diagnosis Date    ADD (attention deficit disorder)     Diabetes (HCC)     pre-diabetic    OCD (obsessive compulsive disorder)     Psychiatric disorder    ,   Past Surgical History:   Procedure Laterality Date    ORTHOPEDIC SURGERY      right elbow-2016,right-forearm     Family History: No family history on file.   Social History:   Social History     Tobacco Use    Smoking status: Former    Smokeless tobacco: Never   Substance Use Topics    Alcohol use: Yes       ALLERGIES:   Allergies   Allergen Reactions    Sulfa Antibiotics Other (See Comments)        Patient Medications    Current Outpatient Medications   Medication Sig Dispense Refill    naproxen (NAPROSYN) 250 MG tablet Take 1 tablet by mouth 2 times daily (with meals) 20 tablet 0    albuterol sulfate HFA (PROAIR HFA) 108 (90 Base) MCG/ACT inhaler Inhale 2 puffs into the lungs every 6 hours as needed for Wheezing 18 g 0    acetaminophen (TYLENOL) 500 MG tablet Tylenol Extra Strength 500 mg tablet Take 1 tablet every 6 hours by oral route as needed.      amphetamine-dextroamphetamine (ADDERALL XR) 30 MG extended release capsule Take 30 mg by mouth daily.

## 2024-05-07 ENCOUNTER — TELEPHONE (OUTPATIENT)
Dept: ORTHOPEDIC SURGERY | Age: 39
End: 2024-05-07

## 2024-05-07 DIAGNOSIS — M54.9 BACK PAIN, UNSPECIFIED BACK LOCATION, UNSPECIFIED BACK PAIN LATERALITY, UNSPECIFIED CHRONICITY: Primary | ICD-10-CM

## 2024-05-07 NOTE — TELEPHONE ENCOUNTER
He is calling again about this and is hoping to hear something back today. Please call to let him know something.

## 2024-05-07 NOTE — TELEPHONE ENCOUNTER
He is having the same reaction to the Zanaflex as he did the other muscle relaxer. He is asking if he can have carisoprodol, he knows he can take those. He had those when he had a herniated disc. He can take that and work. Please let him know.   He does use [facility] in [place] on [address]. He said he also was given this when he had rotator cuff surgery and he did tolerate it.

## 2024-05-08 ENCOUNTER — TELEPHONE (OUTPATIENT)
Dept: ORTHOPEDIC SURGERY | Age: 39
End: 2024-05-08

## 2024-05-08 RX ORDER — CARISOPRODOL 250 MG/1
TABLET ORAL
Qty: 42 TABLET | Refills: 0 | Status: SHIPPED | OUTPATIENT
Start: 2024-05-08 | End: 2025-05-08

## 2024-05-08 NOTE — TELEPHONE ENCOUNTER
He is wanting to see if the Soma RX can be sent in so he can pick it up on his way to work at 4:00. Can you let him know?

## 2024-05-08 NOTE — TELEPHONE ENCOUNTER
Spoke with pt and as per silvestre Law to do 250 mg TID for max of 2 weeks #42 tablets. Inform patient we do not recommend this medication for long-term use.

## 2024-05-08 NOTE — TELEPHONE ENCOUNTER
His RX has not been sent in yet. He is requesting to remind you to send it to [facility] on [address] in [place].

## 2024-05-14 RX ORDER — PREDNISONE 10 MG/1
10 TABLET ORAL SEE ADMIN INSTRUCTIONS
Qty: 48 EACH | Refills: 0 | OUTPATIENT
Start: 2024-05-14 | End: 2024-05-26

## 2024-05-20 DIAGNOSIS — M54.9 BACK PAIN, UNSPECIFIED BACK LOCATION, UNSPECIFIED BACK PAIN LATERALITY, UNSPECIFIED CHRONICITY: ICD-10-CM

## 2024-05-20 RX ORDER — CARISOPRODOL 250 MG/1
TABLET ORAL
Qty: 42 TABLET | Refills: 0 | OUTPATIENT
Start: 2024-05-20 | End: 2025-05-20

## 2024-05-20 NOTE — TELEPHONE ENCOUNTER
He is asking for two more weeks of Soma. His job is very physical and this helps him. It will be two weeks before he can get back in to see Audie. He is willing to take Prednisone too if he thinks this will help. If so, just send it in also. He says he was told one Prednisone in the morning but he got a dose pack the last time and took it as was prescribed. He questioned the pharmacist but he told him to follow the directions on the pack.

## 2024-05-21 ENCOUNTER — TELEPHONE (OUTPATIENT)
Dept: ORTHOPEDIC SURGERY | Age: 39
End: 2024-05-21

## 2024-05-21 DIAGNOSIS — M54.9 BACK PAIN, UNSPECIFIED BACK LOCATION, UNSPECIFIED BACK PAIN LATERALITY, UNSPECIFIED CHRONICITY: Primary | ICD-10-CM

## 2024-05-21 RX ORDER — GABAPENTIN 300 MG/1
300 CAPSULE ORAL 3 TIMES DAILY
Qty: 90 CAPSULE | Refills: 2 | Status: SHIPPED | OUTPATIENT
Start: 2024-05-21 | End: 2024-08-19

## 2024-05-21 NOTE — TELEPHONE ENCOUNTER
He never heard back about the medications. I read the message from Audie and he said he would like to try the Gabapentin. Please send to the pharmacy on file.

## 2024-06-03 ENCOUNTER — TELEPHONE (OUTPATIENT)
Dept: ORTHOPEDIC SURGERY | Age: 39
End: 2024-06-03

## 2024-06-03 NOTE — TELEPHONE ENCOUNTER
He was prescribed 3 different muscle relaxers. One of them worked but the other two haven't worked. He would like to get Soma sent in if possible. The Gabapentin is making him woozy and he's having a hard time working.

## 2024-06-04 ENCOUNTER — TELEPHONE (OUTPATIENT)
Dept: ORTHOPEDIC SURGERY | Age: 39
End: 2024-06-04

## 2024-06-04 NOTE — TELEPHONE ENCOUNTER
Called pt back no answer, unable to lvm. As  per silvestre   I discussed with Dr. Muhammad.  Inform patient will not prescribe more Soma.  We can offer him a referral to physical therapy and a thoracic MRI.  If he cannot afford these, we can given the phone number to contact the hospital about financial assistance/sponsorship.  he has not improved with other medications including steroids, NSAIDS, muscle relaxers and tylenol as well as gabapentin.  Until we have further workup cannot continue treating him.

## 2024-06-04 NOTE — TELEPHONE ENCOUNTER
Spoke with pt and as per silvestre   I discussed with Dr. Muhammad.  Inform patient will not prescribe more Soma.  We can offer him a referral to physical therapy and a thoracic MRI.  If he cannot afford these, we can given the phone number to contact the hospital about financial assistance/sponsorship.  he has not improved with other medications including steroids, NSAIDS, muscle relaxers and tylenol as well as gabapentin.  Until we have further workup cannot continue treating him.   Pt understood

## 2024-06-04 NOTE — TELEPHONE ENCOUNTER
Spoke with pt and as per silvestre   I discussed with Dr. Muhammad.  Inform patient will not prescribe more Soma.  We can offer him a referral to physical therapy and a thoracic MRI.  If he cannot afford these, we can given the phone number to contact the hospital about financial assistance/sponsorship.  he has not improved with other medications including steroids, NSAIDS, muscle relaxers and tylenol as well as gabapentin.  Until we have further workup cannot continue treating him.

## 2024-06-04 NOTE — TELEPHONE ENCOUNTER
He is scheduled to come in to see Audie on the 14th and he ran out of the medication that was prescribed. He is wondering if he can send it in until he can be seen. He is having a hard time working and needs something.

## 2025-07-06 NOTE — PROGRESS NOTES
Vandana Adrian  : 1985  Payor: /  [id number]  at [facility]  [address], [facility], [address]  [phone]   [phone]      OUTPATIENT PHYSICAL THERAPY: Daily Treatment Note 2021  Visit Count:  7    ICD-10: Treatment Diagnosis: left shoulder pain M25.512, left shoulder stiffness  Precautions/Allergies:   Patient has no known allergies. TREATMENT PLAN:  Effective Dates: 3/3/2021 TO 2021 (60 days). Frequency/Duration: 2 times a week for 60 Day(s) MEDICAL/REFERRING DIAGNOSIS:  Pain in left shoulder [M25.512]   DATE OF ONSET:   REFERRING PHYSICIAN: Elia Valencia MD MD Orders: Eval and treat  Return MD Appointment: 3/4/2021     Pre-treatment Symptoms/Complaints:  Patient reports feeling mild shoulder soreness especially with overhead motions   Pain: Initial: Pain Intensity 1: 3  Post Session:  2/10   Medications Last Reviewed:  2021  Updated Objective Findings:  None Today   TREATMENT:   THERAPEUTIC EXERCISE: (44 minutes):  Exercises per grid below to improve mobility and strength. Required minimal verbal cues to promote proper body mechanics. Progressed resistance, range and repetitions as indicated. Scaption 3# 2 x 15  Standing flexion to 90 3# 2 x 15  ER red theraband x 30  Bent over rows 10# 2 x 15  Prone extension 4# 2 x 15  Prone h. abd 2 x 10 3#  UBE x 6 min  Bicep # 30 x 20-double arm  Chest press # 5 2 x 15  Body blade into flex, ER, abd x 30 sec each  Shoulder ER combined with abduction 3# 2 x 15  Shoulder scaption y's red band-overhead x 30  SA work against wall with green TB x 20    Manual Therapy (  na    ): na  na  Therapeutic Modalities ( na    ):na      HEP: As directed. Treatment/Session Summary:    Response to Treatment:  Patient reporting feeling mild shoulder soreness. He reports pain is present with overhead reaching.   He progressed to increased weight today and SA work against the wall with natalia. Communication/Consultation:  None today  Equipment provided today:  red theraband  Recommendations/Intent for next treatment session: Next visit will focus on continued ROM and strengthening.   Total Treatment Billable Duration:  44 min  PT Patient Time In/Time Out  Time In: 0845  Time Out: [address], PT    Future Appointments   Date Time Provider Simran Mckeon   4/5/2021 12:30 PM Tova Emerson, PT [facility] .

## (undated) DEVICE — T-MAX DISPOSABLE FACE MASK 8 PER BOX

## (undated) DEVICE — Device: Brand: JELCO

## (undated) DEVICE — DERMABOND SKIN ADH 0.7ML -- DERMABOND ADVANCED 12/BX

## (undated) DEVICE — BUR SHV CUT HLLW 6 FLUT 5.5MM --

## (undated) DEVICE — SHEET, DRAPE, SPLIT, STERILE: Brand: MEDLINE

## (undated) DEVICE — WATERPROOF, BACTERIA PROOF DRESSING WITH ABSORBENT SEE THROUGH PAD: Brand: OPSITE POST-OP VISIBLE 15X10CM CTN 20

## (undated) DEVICE — SOLUTION IRRIG 3000ML 0.9% SOD CHL FLX CONT 0797208] ICU MEDICAL INC]

## (undated) DEVICE — SURGICAL PROCEDURE PACK BASIC ST FRANCIS

## (undated) DEVICE — SUTURE PDS II SZ 0 L27IN ABSRB VLT L26MM CT-2 1/2 CIR Z334H

## (undated) DEVICE — DRAPE,U/SHT,SPLIT,FILM,60X84,STERILE: Brand: MEDLINE

## (undated) DEVICE — 3M™ IOBAN™ 2 ANTIMICROBIAL INCISE DRAPE 6650EZ: Brand: IOBAN™ 2

## (undated) DEVICE — TRUEPASS DISPOSABLE NEEDLES 5 PER BOX: Brand: TRUEPASS

## (undated) DEVICE — 90-S ACCELERATOR, SUCTION PROBE, NON-BENDABLE, MAX CUT LEVEL 11: Brand: SERFAS ENERGY

## (undated) DEVICE — BLADE SHV DIA4MM RED RESECT FOR GEN DEB REM OF PERIOST FR

## (undated) DEVICE — INTENT TO BE USED WITH SUTURE MATERIAL FOR TISSUE CLOSURE: Brand: RICHARD-ALLAN®  NEEDLE 1/2 CIRCLE REVERSE CUTTING

## (undated) DEVICE — GARMENT,MEDLINE,DVT,INT,CALF,MED, GEN2: Brand: MEDLINE

## (undated) DEVICE — 1010 S-DRAPE TOWEL DRAPE 10/BX: Brand: STERI-DRAPE™

## (undated) DEVICE — SUTURE ABSORBABLE MONOFILAMENT 3-0 PS1 12 IN UD MONOCRYL + SXMP1B101

## (undated) DEVICE — SWITCH DRAPE TENET 7633

## (undated) DEVICE — PREP SKN CHLRAPRP APL 26ML STR --

## (undated) DEVICE — DRAPE,SHOULDER,BEACH CHAIR,STERILE: Brand: MEDLINE

## (undated) DEVICE — INFLOW CASSETTE TUBING, DO NOT USE IF PACKAGE IS DAMAGED: Brand: CROSSFLOW

## (undated) DEVICE — SUT ETHLN 3-0 18IN PS2 BLK --

## (undated) DEVICE — AMD ANTIMICROBIAL GAUZE SPONGES,12 PLY USP TYPE VII, 0.2% POLYHEXAMETHYLENE BIGUANIDE HCI (PHMB): Brand: CURITY

## (undated) DEVICE — 4-PORT MANIFOLD: Brand: NEPTUNE 2

## (undated) DEVICE — NDL SPNE QNCKE 18GX3.5IN LF --

## (undated) DEVICE — OUTFLOW CASSETTE TUBING, DO NOT USE IF PACKAGE IS DAMAGED: Brand: CROSSFLOW

## (undated) DEVICE — REM POLYHESIVE ADULT PATIENT RETURN ELECTRODE: Brand: VALLEYLAB

## (undated) DEVICE — SHOULDER STABILIZATION KIT,                                    DISPOSABLE 12 PER BOX

## (undated) DEVICE — [AGGRESSIVE 6-FLUTE BARREL BUR, ARTHROSCOPIC SHAVER BLADE,  DO NOT RESTERILIZE,  DO NOT USE IF PACKAGE IS DAMAGED,  KEEP DRY,  KEEP AWAY FROM SUNLIGHT]: Brand: FORMULA

## (undated) DEVICE — TUBING, SUCTION, 1/4" X 10', STRAIGHT: Brand: MEDLINE

## (undated) DEVICE — BUTTON SWITCH PENCIL BLADE ELECTRODE, HOLSTER: Brand: EDGE

## (undated) DEVICE — SUTURE MCRYL SZ 2-0 L27IN ABSRB UD CP-1 1 L36MM 1/2 CIR REV Y266H